# Patient Record
Sex: FEMALE | Race: WHITE | NOT HISPANIC OR LATINO | Employment: FULL TIME | URBAN - METROPOLITAN AREA
[De-identification: names, ages, dates, MRNs, and addresses within clinical notes are randomized per-mention and may not be internally consistent; named-entity substitution may affect disease eponyms.]

---

## 2024-01-29 DIAGNOSIS — Z00.00 ANNUAL PHYSICAL EXAM: Primary | ICD-10-CM

## 2024-01-29 DIAGNOSIS — Z82.49 FAMILY HISTORY OF CORONARY ARTERY DISEASE: ICD-10-CM

## 2024-01-29 DIAGNOSIS — E78.5 HYPERLIPIDEMIA, UNSPECIFIED HYPERLIPIDEMIA TYPE: ICD-10-CM

## 2024-02-07 ENCOUNTER — APPOINTMENT (OUTPATIENT)
Dept: RADIOLOGY | Facility: CLINIC | Age: 54
End: 2024-02-07
Payer: COMMERCIAL

## 2024-02-15 ENCOUNTER — HOSPITAL ENCOUNTER (OUTPATIENT)
Dept: RADIOLOGY | Facility: CLINIC | Age: 54
Discharge: HOME | End: 2024-02-15
Payer: COMMERCIAL

## 2024-02-15 VITALS — HEIGHT: 67 IN | WEIGHT: 142.86 LBS | BODY MASS INDEX: 22.42 KG/M2

## 2024-02-15 DIAGNOSIS — Z12.39 ENCOUNTER FOR OTHER SCREENING FOR MALIGNANT NEOPLASM OF BREAST: ICD-10-CM

## 2024-02-15 PROCEDURE — 77063 BREAST TOMOSYNTHESIS BI: CPT | Mod: BILATERAL PROCEDURE | Performed by: RADIOLOGY

## 2024-02-15 PROCEDURE — 77067 SCR MAMMO BI INCL CAD: CPT | Mod: BILATERAL PROCEDURE | Performed by: RADIOLOGY

## 2024-02-15 PROCEDURE — 77067 SCR MAMMO BI INCL CAD: CPT

## 2024-02-17 ASSESSMENT — PROMIS GLOBAL HEALTH SCALE
RATE_AVERAGE_PAIN: 0
CARRYOUT_PHYSICAL_ACTIVITIES: COMPLETELY
RATE_QUALITY_OF_LIFE: EXCELLENT
RATE_GENERAL_HEALTH: EXCELLENT
RATE_MENTAL_HEALTH: EXCELLENT
RATE_SOCIAL_SATISFACTION: EXCELLENT
EMOTIONAL_PROBLEMS: SOMETIMES
CARRYOUT_SOCIAL_ACTIVITIES: EXCELLENT
RATE_AVERAGE_FATIGUE: MILD
RATE_PHYSICAL_HEALTH: EXCELLENT

## 2024-02-20 ENCOUNTER — HOSPITAL ENCOUNTER (OUTPATIENT)
Dept: RADIOLOGY | Facility: CLINIC | Age: 54
Discharge: HOME | End: 2024-02-20
Payer: COMMERCIAL

## 2024-02-20 DIAGNOSIS — R92.8 OTHER ABNORMAL AND INCONCLUSIVE FINDINGS ON DIAGNOSTIC IMAGING OF BREAST: ICD-10-CM

## 2024-02-20 PROCEDURE — 77065 DX MAMMO INCL CAD UNI: CPT | Mod: LEFT SIDE | Performed by: RADIOLOGY

## 2024-02-20 PROCEDURE — 77061 BREAST TOMOSYNTHESIS UNI: CPT | Mod: LEFT SIDE | Performed by: RADIOLOGY

## 2024-02-20 PROCEDURE — 77061 BREAST TOMOSYNTHESIS UNI: CPT | Mod: LT

## 2024-02-22 NOTE — PROGRESS NOTES
"Subjective   Patient ID: Cecilia Pryor is a 53 y.o. female who presents for Annual Exam.  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  Fam Hx of CAD in Father, Paternal uncle and Paternal Grandfather, favor low LDL goal at least under 100     Said she gained a little weight   Still exercising regularly    Allergies   Allergen Reactions    Pollen Extracts Itching      Current Outpatient Medications   Medication Instructions    cetirizine (ZYRTEC) 10 mg capsule 1 capsule, oral    cholecalciferol (Vitamin D-3) 25 MCG (1000 UT) capsule 1 capsule, oral, Daily    ibuprofen (MOTRIN) 600 mg, oral    levonorgestrel (Mirena) 21 mcg/24 hours (8 yrs) 52 mg IUD 1 each, intrauterine    rosuvastatin (CRESTOR) 20 mg, oral, Daily        Review of Systems  Constitutional  No fatigue, no fevers, no chills, no unintentional weight loss,   HEENT:  No headaches, no dizziness, no double vision, no blurred vision, eye exams current no hearing loss  Cardiovascular:  No chest pain, no palpitations, no shortness of breath with exertion (one flight of stairs),   Respiratory:  No cough, no hemoptysis, no wheezing, No shortness of breath at rest  GI:  No dysphagia, no odynophagia, no reflux, no abdominal pain, no nausea, no vomiting, no changes in bowel habits, no bright red blood per rectum, no melena  :  No urinary frequency, no dysuria, no urine incontinence  MSK:  No falls, no joint pain, no joint swelling  Neuro:  No tremors, no extremity weakness, no changes in sensation    Physical Exam  /72   Pulse 80   Resp 16   Ht 1.676 m (5' 6\")   Wt 66.7 kg (147 lb)   SpO2 95%   BMI 23.73 kg/m²   120/72 left arm seated  General:    Fit, " well-appearing  F in no acute distress, well nourished, well hydrated  Head:  Normocephalic, atraumatic  Skin:          Warm dry,   Eyes:  Anicteric sclera, pupils equal   Ears:        TMs intact  Oral:      Not examined due to pandemic  Neck:   Supple, no cervical/supraclavicular adenopathy, no thyromegaly or nodules appreciated on exam  Cor:      Regular rate, normal S1, S2, no murmurs appreciated, no S3, no S4   Lungs:   Clear to auscultation b/l, no wheezes, no rhonchi, no crackles, no accessory respiratory muscle use  Abd:          Soft, nontender, no guarding, no rebound, no hepatosplenomegaly appreciated   Ext:            No lower extremity edema, no palpable cords  Pulses:      Pedal pulses intact  Neuro:   CN2-12 grossly intact   Breasts:     Not examined, declined, just had mammogram    Assessment/Plan   Problem List Items Addressed This Visit       Heterogeneously dense tissue of both breasts on mammography     Discussed dense breast tissue on mammogram decreases the sensitivity of the mammogram and discussed FAST MRI self pay  Fam hx breast cancer  Patient is BRACA negative  Previously saw high-risk breast cancer clinic who recommended q 6 mos MRI alternating with mammogram and q6 month breast exams, she opted against Tamoxifen/raloxifen         Relevant Orders    MR breast bilateral w IV contrast fast screening self pay    Family history of breast cancer    Relevant Orders    MR breast bilateral w IV contrast fast screening self pay    Family history of early CAD     Fam hx CAD (father stent in his 60s, maternal grandfather and paternal uncle had fatal MI in their 50's)  CT Cardiac Score 12/22/2022 of 0   (was 96 4/2023) and was 107 11/5/2021),   Normal Lp(a) 42 (normal at their lab is <75).  On rosuvastatin 10 mg daily, increased to 20 mg daily   Repeat fx lipids in 6-8 wks         Relevant Medications    rosuvastatin (Crestor) 20 mg tablet    Annual physical exam - Primary     -Previously  received influenza vaccine 11/27/2023  -Previously received Covid vaccines 1/7/2021, 2/7/2021, 9/17/2021, 7/29/2022, had Covid illness 11/1/2023 she plans to get booster  -Previously received Tdap 2/25/2015  -Will call to schedule first Shingrix  -Mammogram 2/20/2024, dense breast tissue, previously saw [facility], opted against tamoxifen/raloxifen  -Colonoscopy 11/18/2022 repeat 5 yrs (done at Clinic) (2018 adenomatous polyp)  -EGD 8/6/2018 fundic gland polyp negative for dysplasia, esophagastric junction biopsy, Focal active inflammation or reactive epithelial changes, scant superficial fragment of gastric type mucosa with no diagnostic abnormality   -To see gynecology (LOV 4/28/2023), ?last pap 11/21??)  -Continue weight bearing exercise   -Continue D3 1000 units daily  -Recommend daily calcium intake of 1200 mg ideally through diet or diet and supplement  -Normal BMI  -Labs ordered Hep C Anti and HIV         Encounter for screening for other viral diseases    Relevant Orders    Hepatitis C antibody    HIV 1/2 Antigen/Antibody Screen with Reflex to Confirmation    Hyperlipidemia     Fam hx CAD (father stent in his 60s, maternal grandfather and paternal uncle had fatal MI in their 50's)  CT Cardiac Score 12/22/2022 of 0   (was 96 4/2023) and was 107 11/5/2021),   Normal Lp(a) 42 (normal at their lab is <75).  On rosuvastatin 10 mg daily, increased to 20 mg daily   Repeat fx lipids in 6-8 wks         Relevant Medications    rosuvastatin (Crestor) 20 mg tablet    Other Relevant Orders    Hepatic Function Panel    Lipid Panel    Inguinal lymphadenopathy     Not examined, said unchanged  Prior US 2023 sub-centimeter  Plans to continue to monitor clinically  Plans to also discuss with gynecology             Mei Wheeler DO

## 2024-02-23 ENCOUNTER — OFFICE VISIT (OUTPATIENT)
Dept: PRIMARY CARE | Facility: CLINIC | Age: 54
End: 2024-02-23
Payer: COMMERCIAL

## 2024-02-23 VITALS
SYSTOLIC BLOOD PRESSURE: 120 MMHG | OXYGEN SATURATION: 95 % | BODY MASS INDEX: 23.63 KG/M2 | WEIGHT: 147 LBS | RESPIRATION RATE: 16 BRPM | HEART RATE: 80 BPM | DIASTOLIC BLOOD PRESSURE: 72 MMHG | HEIGHT: 66 IN

## 2024-02-23 DIAGNOSIS — E78.5 HYPERLIPIDEMIA, UNSPECIFIED HYPERLIPIDEMIA TYPE: ICD-10-CM

## 2024-02-23 DIAGNOSIS — R59.0 INGUINAL LYMPHADENOPATHY: ICD-10-CM

## 2024-02-23 DIAGNOSIS — Z80.3 FAMILY HISTORY OF BREAST CANCER: ICD-10-CM

## 2024-02-23 DIAGNOSIS — R92.333 HETEROGENEOUSLY DENSE TISSUE OF BOTH BREASTS ON MAMMOGRAPHY: ICD-10-CM

## 2024-02-23 DIAGNOSIS — Z11.59 ENCOUNTER FOR SCREENING FOR OTHER VIRAL DISEASES: ICD-10-CM

## 2024-02-23 DIAGNOSIS — Z82.49 FAMILY HISTORY OF EARLY CAD: ICD-10-CM

## 2024-02-23 DIAGNOSIS — Z00.00 ANNUAL PHYSICAL EXAM: Primary | ICD-10-CM

## 2024-02-23 PROCEDURE — 1036F TOBACCO NON-USER: CPT | Performed by: SPECIALIST

## 2024-02-23 PROCEDURE — 99396 PREV VISIT EST AGE 40-64: CPT | Performed by: SPECIALIST

## 2024-02-23 RX ORDER — VIT C/E/ZN/COPPR/LUTEIN/ZEAXAN 250MG-90MG
1 CAPSULE ORAL DAILY
COMMUNITY
Start: 2022-10-19

## 2024-02-23 RX ORDER — ROSUVASTATIN CALCIUM 10 MG/1
1 TABLET, COATED ORAL DAILY
COMMUNITY
Start: 2022-12-29 | End: 2024-02-23 | Stop reason: DRUGHIGH

## 2024-02-23 RX ORDER — ROSUVASTATIN CALCIUM 20 MG/1
20 TABLET, COATED ORAL DAILY
Qty: 90 TABLET | Refills: 1 | Status: SHIPPED | OUTPATIENT
Start: 2024-02-23 | End: 2024-08-21

## 2024-02-23 RX ORDER — PHENYLPROPANOLAMINE/CLEMASTINE 75-1.34MG
600 TABLET, EXTENDED RELEASE ORAL
COMMUNITY

## 2024-02-23 ASSESSMENT — ENCOUNTER SYMPTOMS
DEPRESSION: 0
OCCASIONAL FEELINGS OF UNSTEADINESS: 0
LOSS OF SENSATION IN FEET: 0

## 2024-02-23 ASSESSMENT — PATIENT HEALTH QUESTIONNAIRE - PHQ9
2. FEELING DOWN, DEPRESSED OR HOPELESS: NOT AT ALL
1. LITTLE INTEREST OR PLEASURE IN DOING THINGS: NOT AT ALL
SUM OF ALL RESPONSES TO PHQ9 QUESTIONS 1 AND 2: 0

## 2024-02-23 NOTE — ASSESSMENT & PLAN NOTE
Discussed dense breast tissue on mammogram decreases the sensitivity of the mammogram and discussed FAST MRI self pay  Fam hx breast cancer  Patient is BRACA negative  Previously saw high-risk breast cancer clinic who recommended q 6 mos MRI alternating with mammogram and q6 month breast exams, she opted against Tamoxifen/raloxifen

## 2024-02-23 NOTE — ASSESSMENT & PLAN NOTE
-Previously received influenza vaccine 11/27/2023  -Previously received Covid vaccines 1/7/2021, 2/7/2021, 9/17/2021, 7/29/2022, had Covid illness 11/1/2023 she plans to get booster  -Previously received Tdap 2/25/2015  -Will call to schedule first Shingrix  -Mammogram 2/20/2024, dense breast tissue, previously saw [facility], opted against tamoxifen/raloxifen  -Colonoscopy 11/18/2022 repeat 5 yrs (done at Clinic) (2018 adenomatous polyp)  -EGD 8/6/2018 fundic gland polyp negative for dysplasia, esophagastric junction biopsy, Focal active inflammation or reactive epithelial changes, scant superficial fragment of gastric type mucosa with no diagnostic abnormality   -To see gynecology (LOV 4/28/2023), ?last pap 11/21??)  -Continue weight bearing exercise   -Continue D3 1000 units daily  -Recommend daily calcium intake of 1200 mg ideally through diet or diet and supplement  -Normal BMI  -Labs ordered Hep C Anti and HIV

## 2024-02-24 PROBLEM — R59.0 INGUINAL LYMPHADENOPATHY: Status: ACTIVE | Noted: 2024-02-24

## 2024-02-24 NOTE — ASSESSMENT & PLAN NOTE
Fam hx CAD (father stent in his 60s, maternal grandfather and paternal uncle had fatal MI in their 50's)  CT Cardiac Score 12/22/2022 of 0   (was 96 4/2023) and was 107 11/5/2021),   Normal Lp(a) 42 (normal at their lab is <75).  On rosuvastatin 10 mg daily, increased to 20 mg daily   Repeat fx lipids in 6-8 wks

## 2024-02-24 NOTE — ASSESSMENT & PLAN NOTE
Not examined, said unchanged  Prior  2023 sub-centimeter  Plans to continue to monitor clinically  Plans to also discuss with gynecology

## 2024-05-03 ENCOUNTER — OFFICE VISIT (OUTPATIENT)
Dept: OBSTETRICS AND GYNECOLOGY | Facility: CLINIC | Age: 54
End: 2024-05-03
Payer: COMMERCIAL

## 2024-05-03 VITALS
DIASTOLIC BLOOD PRESSURE: 82 MMHG | BODY MASS INDEX: 24.85 KG/M2 | WEIGHT: 154.6 LBS | SYSTOLIC BLOOD PRESSURE: 138 MMHG | HEIGHT: 66 IN | HEART RATE: 80 BPM

## 2024-05-03 DIAGNOSIS — Z80.3 FAMILY HISTORY OF BREAST CANCER: Primary | ICD-10-CM

## 2024-05-03 DIAGNOSIS — Z12.4 CERVICAL CANCER SCREENING: ICD-10-CM

## 2024-05-03 PROCEDURE — 99396 PREV VISIT EST AGE 40-64: CPT | Performed by: OBSTETRICS & GYNECOLOGY

## 2024-05-03 PROCEDURE — 87624 HPV HI-RISK TYP POOLED RSLT: CPT | Performed by: OBSTETRICS & GYNECOLOGY

## 2024-05-03 PROCEDURE — 1036F TOBACCO NON-USER: CPT | Performed by: OBSTETRICS & GYNECOLOGY

## 2024-05-03 ASSESSMENT — ENCOUNTER SYMPTOMS
ALLERGIC/IMMUNOLOGIC NEGATIVE: 0
HEMATOLOGIC/LYMPHATIC NEGATIVE: 0
CONSTITUTIONAL NEGATIVE: 0
NEUROLOGICAL NEGATIVE: 0
GASTROINTESTINAL NEGATIVE: 0
RESPIRATORY NEGATIVE: 0
MUSCULOSKELETAL NEGATIVE: 0
CARDIOVASCULAR NEGATIVE: 0
ENDOCRINE NEGATIVE: 0
EYES NEGATIVE: 0
PSYCHIATRIC NEGATIVE: 0

## 2024-05-03 ASSESSMENT — PATIENT HEALTH QUESTIONNAIRE - PHQ9
SUM OF ALL RESPONSES TO PHQ9 QUESTIONS 1 AND 2: 0
2. FEELING DOWN, DEPRESSED OR HOPELESS: NOT AT ALL
1. LITTLE INTEREST OR PLEASURE IN DOING THINGS: NOT AT ALL

## 2024-05-03 ASSESSMENT — PAIN SCALES - GENERAL: PAINLEVEL: 0-NO PAIN

## 2024-05-03 NOTE — PROGRESS NOTES
Cecilia Pryor 54 y.o. y/o who presents for annual gyn exam.      Preventive health  - Pap due today, roel (has had normal screening previously, likely last in 2022, but unable to obtain records, will reset screening interval)  - breast cancer screening --> gets fast MRI  - c-scope due 2027 (last in 2022 @ [facility], no specimens at this procedure), has history of adenomas    Reproductive Life Planning  - has LNG IUD in place, wants to leave in until 2025      -------------------------------------  HPI    Here for annual  No complaints  No urinary, bowel complaints  No vaginal bleeding  No complaints with intercourse  Declines menopausal symptoms  Gets mammogram, fast MRI for fam hx of breast cancer (BRCA negative)        Vitals:    05/03/24 0836   BP: 138/82   Pulse: 80       Physical Exam  Constitutional:       Appearance: Normal appearance.   Genitourinary:      Vulva normal.      Right Labia: No rash, lesions, skin changes or Bartholin's cyst.     Left Labia: No lesions, skin changes, Bartholin's cyst or rash.     No cervical discharge, lesion or polyp.      IUD strings visualized.   HENT:      Head: Normocephalic and atraumatic.   Pulmonary:      Effort: Pulmonary effort is normal.   Musculoskeletal:      Cervical back: Neck supple.   Neurological:      General: No focal deficit present.      Mental Status: She is alert.   Skin:     General: Skin is warm.   Psychiatric:         Thought Content: Thought content normal.

## 2024-05-08 PROBLEM — Z86.010 HX OF ADENOMATOUS COLONIC POLYPS: Status: ACTIVE | Noted: 2018-01-01

## 2024-05-08 PROBLEM — Z82.49 FAMILY HISTORY OF ISCHEMIC HEART DISEASE: Status: ACTIVE | Noted: 2024-05-08

## 2024-05-08 PROBLEM — Z85.828 HISTORY OF MALIGNANT NEOPLASM OF SKIN: Status: ACTIVE | Noted: 2024-05-08

## 2024-05-08 PROBLEM — Z86.0101 HX OF ADENOMATOUS COLONIC POLYPS: Status: ACTIVE | Noted: 2018-01-01

## 2024-05-16 LAB
CYTOLOGY CMNT CVX/VAG CYTO-IMP: NORMAL
HPV HR 12 DNA GENITAL QL NAA+PROBE: NEGATIVE
HPV HR GENOTYPES PNL CVX NAA+PROBE: NEGATIVE
HPV16 DNA SPEC QL NAA+PROBE: NEGATIVE
HPV18 DNA SPEC QL NAA+PROBE: NEGATIVE
LAB AP HPV GENOTYPE QUESTION: YES
LAB AP HPV HR: NORMAL
LABORATORY COMMENT REPORT: NORMAL
PATH REPORT.TOTAL CANCER: NORMAL

## 2024-06-19 DIAGNOSIS — E78.5 HYPERLIPIDEMIA, UNSPECIFIED HYPERLIPIDEMIA TYPE: ICD-10-CM

## 2024-06-19 DIAGNOSIS — Z82.49 FAMILY HISTORY OF EARLY CAD: ICD-10-CM

## 2024-06-20 RX ORDER — ROSUVASTATIN CALCIUM 20 MG/1
20 TABLET, COATED ORAL DAILY
Qty: 90 TABLET | Refills: 2 | Status: SHIPPED | OUTPATIENT
Start: 2024-06-20

## 2024-08-02 ENCOUNTER — OFFICE VISIT (OUTPATIENT)
Dept: OBSTETRICS AND GYNECOLOGY | Facility: CLINIC | Age: 54
End: 2024-08-02
Payer: COMMERCIAL

## 2024-08-02 VITALS
WEIGHT: 154.6 LBS | BODY MASS INDEX: 24.85 KG/M2 | DIASTOLIC BLOOD PRESSURE: 81 MMHG | HEIGHT: 66 IN | SYSTOLIC BLOOD PRESSURE: 113 MMHG

## 2024-08-02 DIAGNOSIS — Z12.4 CERVICAL CANCER SCREENING: Primary | ICD-10-CM

## 2024-08-02 PROCEDURE — 1036F TOBACCO NON-USER: CPT | Performed by: OBSTETRICS & GYNECOLOGY

## 2024-08-02 PROCEDURE — 99212 OFFICE O/P EST SF 10 MIN: CPT | Performed by: OBSTETRICS & GYNECOLOGY

## 2024-08-02 PROCEDURE — 3008F BODY MASS INDEX DOCD: CPT | Performed by: OBSTETRICS & GYNECOLOGY

## 2024-08-02 ASSESSMENT — PATIENT HEALTH QUESTIONNAIRE - PHQ9
1. LITTLE INTEREST OR PLEASURE IN DOING THINGS: NOT AT ALL
2. FEELING DOWN, DEPRESSED OR HOPELESS: NOT AT ALL
SUM OF ALL RESPONSES TO PHQ9 QUESTIONS 1 AND 2: 0

## 2024-08-02 ASSESSMENT — ENCOUNTER SYMPTOMS
PSYCHIATRIC NEGATIVE: 0
HEMATOLOGIC/LYMPHATIC NEGATIVE: 0
GASTROINTESTINAL NEGATIVE: 0
CONSTITUTIONAL NEGATIVE: 0
CARDIOVASCULAR NEGATIVE: 0
NEUROLOGICAL NEGATIVE: 0
ENDOCRINE NEGATIVE: 0
ALLERGIC/IMMUNOLOGIC NEGATIVE: 0
MUSCULOSKELETAL NEGATIVE: 0
RESPIRATORY NEGATIVE: 0
EYES NEGATIVE: 0

## 2024-08-02 ASSESSMENT — PAIN SCALES - GENERAL: PAINLEVEL: 0-NO PAIN

## 2024-08-02 NOTE — PROGRESS NOTES
"        Cecilia Pryor presents today with:       Here for repeat Pap, last insufficient  Also with new vaginal pain - discussed lubricants, also briefly introduced vaginal estrogen.          -----------------------------------------------------------------------  ROX    Presents today with  Here for repeat Pap, previous was insufficient    Reports new onset pain after intercourse  Some pink tinged with wiping  Feels pain anteriorly          Visit Vitals  /81   Ht 1.676 m (5' 6\")   Wt 70.1 kg (154 lb 9.6 oz)   BMI 24.95 kg/m²   OB Status Perimenopausal   Smoking Status Never   BSA 1.81 m²       Physical Exam  Constitutional:       Appearance: Normal appearance.   Genitourinary:      Vulva normal.      No lesions in the vagina.      No vaginal erythema, bleeding or ulceration.      Vaginal exam comments: Loss of rugae.   HENT:      Head: Normocephalic and atraumatic.   Pulmonary:      Effort: Pulmonary effort is normal.   Musculoskeletal:      Cervical back: Neck supple.   Neurological:      General: No focal deficit present.      Mental Status: She is alert.   Skin:     General: Skin is warm.   Psychiatric:         Mood and Affect: Mood normal.             "

## 2024-08-08 LAB
CYTOLOGY CMNT CVX/VAG CYTO-IMP: NORMAL
LAB AP HPV HR: NORMAL
LAB AP PREVIOUS ABNORMAL HISTORY: NORMAL
LABORATORY COMMENT REPORT: NORMAL
PATH REPORT.TOTAL CANCER: NORMAL

## 2024-09-13 ENCOUNTER — HOSPITAL ENCOUNTER (OUTPATIENT)
Dept: RADIOLOGY | Facility: HOSPITAL | Age: 54
Discharge: HOME | End: 2024-09-13

## 2024-09-13 DIAGNOSIS — Z80.3 FAMILY HISTORY OF BREAST CANCER: ICD-10-CM

## 2024-09-24 ENCOUNTER — APPOINTMENT (OUTPATIENT)
Dept: RADIOLOGY | Facility: HOSPITAL | Age: 54
End: 2024-09-24

## 2024-10-11 ENCOUNTER — OFFICE VISIT (OUTPATIENT)
Dept: OBSTETRICS AND GYNECOLOGY | Facility: CLINIC | Age: 54
End: 2024-10-11
Payer: COMMERCIAL

## 2024-10-11 VITALS
HEART RATE: 75 BPM | WEIGHT: 149 LBS | SYSTOLIC BLOOD PRESSURE: 151 MMHG | HEIGHT: 66 IN | DIASTOLIC BLOOD PRESSURE: 76 MMHG | BODY MASS INDEX: 23.95 KG/M2

## 2024-10-11 DIAGNOSIS — N76.0 BACTERIAL VAGINOSIS: ICD-10-CM

## 2024-10-11 DIAGNOSIS — B96.89 BACTERIAL VAGINOSIS: ICD-10-CM

## 2024-10-11 DIAGNOSIS — R87.615 UNSATISFACTORY CERVICAL PAPANICOLAOU SMEAR: Primary | ICD-10-CM

## 2024-10-11 PROCEDURE — 88342 IMHCHEM/IMCYTCHM 1ST ANTB: CPT | Performed by: PATHOLOGY

## 2024-10-11 PROCEDURE — 57454 BX/CURETT OF CERVIX W/SCOPE: CPT | Performed by: OBSTETRICS & GYNECOLOGY

## 2024-10-11 PROCEDURE — 88305 TISSUE EXAM BY PATHOLOGIST: CPT | Mod: TC,SUR | Performed by: OBSTETRICS & GYNECOLOGY

## 2024-10-11 PROCEDURE — 1036F TOBACCO NON-USER: CPT | Performed by: OBSTETRICS & GYNECOLOGY

## 2024-10-11 PROCEDURE — 88305 TISSUE EXAM BY PATHOLOGIST: CPT | Performed by: PATHOLOGY

## 2024-10-11 PROCEDURE — 3008F BODY MASS INDEX DOCD: CPT | Performed by: OBSTETRICS & GYNECOLOGY

## 2024-10-11 RX ORDER — METRONIDAZOLE 7.5 MG/G
GEL VAGINAL DAILY
Qty: 70 G | Refills: 2 | Status: SHIPPED | OUTPATIENT
Start: 2024-10-11 | End: 2024-10-16

## 2024-10-11 ASSESSMENT — ENCOUNTER SYMPTOMS
CONSTITUTIONAL NEGATIVE: 0
CARDIOVASCULAR NEGATIVE: 0
ENDOCRINE NEGATIVE: 0
MUSCULOSKELETAL NEGATIVE: 0
RESPIRATORY NEGATIVE: 0
NEUROLOGICAL NEGATIVE: 0
HEMATOLOGIC/LYMPHATIC NEGATIVE: 0
EYES NEGATIVE: 0
GASTROINTESTINAL NEGATIVE: 0
PSYCHIATRIC NEGATIVE: 0
ALLERGIC/IMMUNOLOGIC NEGATIVE: 0

## 2024-10-11 ASSESSMENT — PATIENT HEALTH QUESTIONNAIRE - PHQ9
1. LITTLE INTEREST OR PLEASURE IN DOING THINGS: NOT AT ALL
SUM OF ALL RESPONSES TO PHQ9 QUESTIONS 1 AND 2: 0
2. FEELING DOWN, DEPRESSED OR HOPELESS: NOT AT ALL

## 2024-10-11 ASSESSMENT — PAIN SCALES - GENERAL: PAINLEVEL: 0-NO PAIN

## 2024-10-21 LAB
LAB AP ASR DISCLAIMER: NORMAL
LABORATORY COMMENT REPORT: NORMAL
PATH REPORT.FINAL DX SPEC: NORMAL
PATH REPORT.GROSS SPEC: NORMAL
PATH REPORT.RELEVANT HX SPEC: NORMAL
PATH REPORT.TOTAL CANCER: NORMAL

## 2024-10-23 NOTE — PROGRESS NOTES
"    Colpo Visit    HPI    Here for colposcopy after Paps x 2 were insufficient for interpretation  IUD in place  tobacco use: No      Vitals  /76   Pulse 75   Ht 1.676 m (5' 6\")   Wt 67.6 kg (149 lb)   BMI 24.05 kg/m²     General Appearance:    Alert, cooperative, no distress, appears stated age   Head:    Normocephalic, without obvious abnormality, atraumatic   Throat:   Lips, mucosa, and tongue normal; teeth and gums normal   Neck:   Supple   Lungs:     Breathing easily   Genitalia:    Normal genitalia without lesion   Skin:   Skin color, texture, turgor normal, no rashes or lesions           Colposcopy Procedure Note    Procedure Details   The risks and benefits of the procedure and Written informed consent obtained.    Speculum placed in vagina and excellent visualization of cervix achieved, cervix swabbed x 3 with acetic acid solution.    Findings:  Cervix: fine blood vessels at 5-6 o'clock, faint acetowhite lesions in same location; cervical biopsies taken at 6 o'clock.  Vaginal inspection: normal without visible lesions.              Specimens: 6 o'clock, ECC    Complications: None, tolerated well.      Plan:  Specimens labelled and sent to Pathology.    Impression:  Normal/mild abnormality  Likely will need repeat Pap with HPV test in 1 year  "

## 2024-11-08 ENCOUNTER — HOSPITAL ENCOUNTER (OUTPATIENT)
Dept: RADIOLOGY | Facility: HOSPITAL | Age: 54
Discharge: HOME | End: 2024-11-08
Payer: COMMERCIAL

## 2024-11-08 PROCEDURE — 6100000003 BI MR BREAST BILATERAL WITH CONTRAST FAST SCREENING SELF PAY

## 2024-11-08 PROCEDURE — 2550000001 HC RX 255 CONTRASTS: Performed by: OBSTETRICS & GYNECOLOGY

## 2024-11-08 PROCEDURE — A9575 INJ GADOTERATE MEGLUMI 0.1ML: HCPCS | Performed by: OBSTETRICS & GYNECOLOGY

## 2024-11-08 RX ORDER — GADOTERATE MEGLUMINE 376.9 MG/ML
13 INJECTION INTRAVENOUS
Status: COMPLETED | OUTPATIENT
Start: 2024-11-08 | End: 2024-11-08

## 2025-01-17 ENCOUNTER — APPOINTMENT (OUTPATIENT)
Dept: RADIOLOGY | Facility: HOSPITAL | Age: 55
End: 2025-01-17
Payer: COMMERCIAL

## 2025-02-28 ENCOUNTER — APPOINTMENT (OUTPATIENT)
Dept: PRIMARY CARE | Facility: CLINIC | Age: 55
End: 2025-02-28
Payer: COMMERCIAL

## 2025-02-28 VITALS
HEART RATE: 76 BPM | OXYGEN SATURATION: 99 % | BODY MASS INDEX: 24.91 KG/M2 | WEIGHT: 155 LBS | DIASTOLIC BLOOD PRESSURE: 68 MMHG | RESPIRATION RATE: 16 BRPM | HEIGHT: 66 IN | SYSTOLIC BLOOD PRESSURE: 132 MMHG

## 2025-02-28 DIAGNOSIS — Z82.49 FAMILY HISTORY OF EARLY CAD: ICD-10-CM

## 2025-02-28 DIAGNOSIS — Z85.828 HISTORY OF MALIGNANT NEOPLASM OF SKIN: ICD-10-CM

## 2025-02-28 DIAGNOSIS — Z11.59 ENCOUNTER FOR SCREENING FOR OTHER VIRAL DISEASES: ICD-10-CM

## 2025-02-28 DIAGNOSIS — R68.81 EARLY SATIETY: ICD-10-CM

## 2025-02-28 DIAGNOSIS — Z00.00 ANNUAL PHYSICAL EXAM: Primary | ICD-10-CM

## 2025-02-28 DIAGNOSIS — R92.333 HETEROGENEOUSLY DENSE TISSUE OF BOTH BREASTS ON MAMMOGRAPHY: ICD-10-CM

## 2025-02-28 DIAGNOSIS — Z12.31 ENCOUNTER FOR SCREENING MAMMOGRAM FOR MALIGNANT NEOPLASM OF BREAST: ICD-10-CM

## 2025-02-28 DIAGNOSIS — Z80.3 FAMILY HISTORY OF BREAST CANCER: ICD-10-CM

## 2025-02-28 DIAGNOSIS — E78.5 HYPERLIPIDEMIA, UNSPECIFIED HYPERLIPIDEMIA TYPE: ICD-10-CM

## 2025-02-28 PROCEDURE — 3008F BODY MASS INDEX DOCD: CPT | Performed by: SPECIALIST

## 2025-02-28 PROCEDURE — 1036F TOBACCO NON-USER: CPT | Performed by: SPECIALIST

## 2025-02-28 PROCEDURE — 99396 PREV VISIT EST AGE 40-64: CPT | Performed by: SPECIALIST

## 2025-02-28 RX ORDER — ROSUVASTATIN CALCIUM 20 MG/1
20 TABLET, COATED ORAL DAILY
Qty: 90 TABLET | Refills: 3 | Status: SHIPPED | OUTPATIENT
Start: 2025-02-28

## 2025-02-28 NOTE — ASSESSMENT & PLAN NOTE
Family history of CAD father age 60, paternal uncle (50's) and paternal grandfather (50's)  CT cardiac score 12/2022 of 0   Normal LPA 42   last year  Orders:    rosuvastatin (Crestor) 20 mg tablet; Take 1 tablet (20 mg) by mouth once daily.

## 2025-02-28 NOTE — ASSESSMENT & PLAN NOTE
Previously received annual influenza vaccine 10/2024  Recommend Covid vaccine  Prior Tdap 2/25/15 recommend every 10 yrs  Recommend Prevnar 21 or 20  Recommend Shingrix vaccines  Mammogram 2/15/2024, Diagnositic Left Mammogram 2/20/2024  Fast MRI 11/8/2024 negative  Plans to see gynecology (LOV 10/11/2024, colposcopy)  Colonosocpy 11/18/2022 repeat 5 yrs (Hx of adenomatous polyp 2018)  Prior EGD 2018  Orders:    Comprehensive Metabolic Panel; Future    Lipid Panel; Future    CBC; Future    Vitamin D 25-Hydroxy,Total (for eval of Vitamin D levels); Future

## 2025-02-28 NOTE — PATIENT INSTRUCTIONS
Recommend Covid vaccine  Recommend Prevnar 21 or 20 (pneumonia vaccine)  Recommend Tdap   Recommend Shingrix vaccines

## 2025-02-28 NOTE — PROGRESS NOTES
Subjective   Patient ID: Cecilia Pryor is a 54 y.o. female who presents for No chief complaint on file..  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  Eats decent lunch still a little hungry but then no appetite for dinner or if she snacks     Son at [place] had septic shock (treated at [facility])    Brought form insurance carrier, she'll fill in labs when results are back (usually has to go to [facility])  Will send copy of form/labs if she goes to [facility]    Allergies   Allergen Reactions    Pollen Extracts Itching      Current Outpatient Medications   Medication Instructions    cetirizine (ZYRTEC) 10 mg capsule 1 capsule    cholecalciferol (Vitamin D-3) 25 MCG (1000 UT) capsule 1 capsule, Daily    ibuprofen (MOTRIN) 600 mg    levonorgestrel (Mirena) 21 mcg/24 hours (8 yrs) 52 mg IUD 1 each    rosuvastatin (CRESTOR) 20 mg, oral, Daily        Review of Systems  Constitutional  No fatigue, no fevers, no chills, no unintentional weight loss,   HEENT:  No headaches, no dizziness, no double vision, eye exams current, no blurred vision, no hearing loss  Cardiovascular:  No chest pain, no palpitations, no shortness of breath with exertion (one flight of stairs),   Respiratory:  No cough, no hemoptysis, no wheezing, No shortness of breath at rest  GI:  No dysphagia, no odynophagia, no reflux, no abdominal pain, no nausea, no vomiting, no changes in bowel habits, no bright red blood per rectum, no melena  :  No urinary frequency, no dysuria, no urine incontinence  MSK:  No falls, left hip (rising to sitting) joint pain, no joint swelling  Neuro:  No tremors, no extremity weakness, no changes in  "sensation  Breasts:  No abnormalities on self  breast exam no nipple discharge no skin changes    Physical Exam  /68   Pulse 76   Resp 16   Ht 1.67 m (5' 5.75\")   Wt 70.3 kg (155 lb)   SpO2 99%   BMI 25.21 kg/m² t  Waist 34.5 Hip 40\"  General:    Well-appearing  F in no acute distress, well nourished, well hydrated  Head:  Normocephalic, atraumatic  Skin:          Warm dry,   Eyes:  Anicteric sclera, pupils equal,   Ears:        TMs intact  Oral:      Not examined due to pandemic  Neck:   Supple, no cervical/supraclavicular adenopathy, no thyromegaly or nodules appreciated on exam  Cor:      Regular rate, normal S1, S2, no murmurs appreciated, no S3, no S4   Lungs:   Clear to auscultation b/l, no wheezes, no rhonchi, no crackles, no accessory respiratory muscle use  Abd:          Soft, nontender, no guarding, no rebound, no hepatosplenomegaly appreciated   Ext:            No lower extremity edema, no palpable cords  Pulses:      Pedal pulses intact  Neuro:   CN2-12 grossly intact (except funduscopic exam not performed and visual fields not examined)  Breasts:     Declined getting mammogram soon     Assessment/Plan   Assessment & Plan  Hyperlipidemia, unspecified hyperlipidemia type   (2/15/2024, see media) and was 153 11/2020  CT cardiac score 12/2022 of 0   Normal LPA 42  Fam hx CAD (Father age 60, Paternal uncle and Paternal Grandfather  Continue current dose rosuvastatin, refilled  Labs ordered  Orders:    rosuvastatin (Crestor) 20 mg tablet; Take 1 tablet (20 mg) by mouth once daily.    Comprehensive Metabolic Panel; Future    Lipid Panel; Future    CBC; Future    Family history of early CAD  Family history of CAD father age 60, paternal uncle (50's) and paternal grandfather (50's)  CT cardiac score 12/2022 of 0   Normal LPA 42   last year  Orders:    rosuvastatin (Crestor) 20 mg tablet; Take 1 tablet (20 mg) by mouth once daily.    Encounter for screening mammogram for malignant " neoplasm of breast  Mammogram 2/15/2024, Diagnositic Left Mammogram 2/20/2024  Fast MRI 11/8/2024 negative  Screening mammogram ordered  Orders:    BI mammo bilateral screening tomosynthesis; Future    Encounter for screening for other viral diseases  Labs ordered one time USPSTF recommended screening  Orders:    Hepatitis C Antibody; Future    Annual physical exam  Previously received annual influenza vaccine 10/2024  Recommend Covid vaccine  Prior Tdap 2/25/15 recommend every 10 yrs  Recommend Prevnar 21 or 20  Recommend Shingrix vaccines  Mammogram 2/15/2024, Diagnositic Left Mammogram 2/20/2024  Fast MRI 11/8/2024 negative  Plans to see gynecology (LOV 10/11/2024, colposcopy)  Colonosocpy 11/18/2022 repeat 5 yrs (Hx of adenomatous polyp 2018)  Prior EGD 2018  Orders:    Comprehensive Metabolic Panel; Future    Lipid Panel; Future    CBC; Future    Vitamin D 25-Hydroxy,Total (for eval of Vitamin D levels); Future    Early satiety  Ordered CT Abd/pelvis with contrast  Orders:    CT abdomen pelvis w IV contrast; Future    Heterogeneously dense tissue of both breasts on mammography  Dense breast tissue and Fam Hx Breast Cancer  Previous high-risk breast center eval and recommendations were for mammogram alternating with MRI Breast every 6 mos  Alternates mammogram with FAST MRI  FAST MRI 11/8/2024       History of malignant neoplasm of skin  Sees Derm Monday  Personal hx Basal Cell  Fam Hx Melanoma       Family history of breast cancer  Previous high-risk breast center eval   Mammogram alternating with FAST MRI was recommended   FAST MRI 11/8/2024          eMi Wheeler DO

## 2025-02-28 NOTE — ASSESSMENT & PLAN NOTE
(2/15/2024, see media) and was 153 11/2020  CT cardiac score 12/2022 of 0   Normal LPA 42  Fam hx CAD (Father age 60, Paternal uncle and Paternal Grandfather  Continue current dose rosuvastatin, refilled  Labs ordered  Orders:    rosuvastatin (Crestor) 20 mg tablet; Take 1 tablet (20 mg) by mouth once daily.    Comprehensive Metabolic Panel; Future    Lipid Panel; Future    CBC; Future

## 2025-03-05 LAB
25(OH)D3+25(OH)D2 SERPL-MCNC: 49 NG/ML (ref 30–100)
ALBUMIN SERPL-MCNC: 4.7 G/DL (ref 3.6–5.1)
ALP SERPL-CCNC: 61 U/L (ref 37–153)
ALT SERPL-CCNC: 18 U/L (ref 6–29)
ANION GAP SERPL CALCULATED.4IONS-SCNC: 9 MMOL/L (CALC) (ref 7–17)
AST SERPL-CCNC: 22 U/L (ref 10–35)
BILIRUB SERPL-MCNC: 0.7 MG/DL (ref 0.2–1.2)
BUN SERPL-MCNC: 14 MG/DL (ref 7–25)
CALCIUM SERPL-MCNC: 9.7 MG/DL (ref 8.6–10.4)
CHLORIDE SERPL-SCNC: 101 MMOL/L (ref 98–110)
CHOLEST SERPL-MCNC: 213 MG/DL
CHOLEST/HDLC SERPL: 2.3 (CALC)
CO2 SERPL-SCNC: 30 MMOL/L (ref 20–32)
CREAT SERPL-MCNC: 0.84 MG/DL (ref 0.5–1.03)
EGFRCR SERPLBLD CKD-EPI 2021: 83 ML/MIN/1.73M2
ERYTHROCYTE [DISTWIDTH] IN BLOOD BY AUTOMATED COUNT: 12.5 % (ref 11–15)
GLUCOSE SERPL-MCNC: 92 MG/DL (ref 65–99)
HCT VFR BLD AUTO: 40.2 % (ref 35–45)
HCV AB SERPL QL IA: NORMAL
HDLC SERPL-MCNC: 94 MG/DL
HGB BLD-MCNC: 13.4 G/DL (ref 11.7–15.5)
LDLC SERPL CALC-MCNC: 103 MG/DL (CALC)
MCH RBC QN AUTO: 30 PG (ref 27–33)
MCHC RBC AUTO-ENTMCNC: 33.3 G/DL (ref 32–36)
MCV RBC AUTO: 90.1 FL (ref 80–100)
NONHDLC SERPL-MCNC: 119 MG/DL (CALC)
PLATELET # BLD AUTO: 253 THOUSAND/UL (ref 140–400)
PMV BLD REES-ECKER: 9.2 FL (ref 7.5–12.5)
POTASSIUM SERPL-SCNC: 4.2 MMOL/L (ref 3.5–5.3)
PROT SERPL-MCNC: 7 G/DL (ref 6.1–8.1)
RBC # BLD AUTO: 4.46 MILLION/UL (ref 3.8–5.1)
SODIUM SERPL-SCNC: 140 MMOL/L (ref 135–146)
TRIGL SERPL-MCNC: 69 MG/DL
WBC # BLD AUTO: 4.8 THOUSAND/UL (ref 3.8–10.8)

## 2025-03-12 ENCOUNTER — HOSPITAL ENCOUNTER (OUTPATIENT)
Dept: RADIOLOGY | Facility: CLINIC | Age: 55
Discharge: HOME | End: 2025-03-12
Payer: COMMERCIAL

## 2025-03-12 VITALS — BODY MASS INDEX: 24.91 KG/M2 | HEIGHT: 66 IN | WEIGHT: 155 LBS

## 2025-03-12 DIAGNOSIS — Z12.31 ENCOUNTER FOR SCREENING MAMMOGRAM FOR MALIGNANT NEOPLASM OF BREAST: ICD-10-CM

## 2025-03-12 PROCEDURE — 77067 SCR MAMMO BI INCL CAD: CPT | Performed by: RADIOLOGY

## 2025-03-12 PROCEDURE — 77063 BREAST TOMOSYNTHESIS BI: CPT | Performed by: RADIOLOGY

## 2025-03-12 PROCEDURE — 77067 SCR MAMMO BI INCL CAD: CPT

## 2025-03-16 PROBLEM — Z82.49 FAMILY HISTORY OF ISCHEMIC HEART DISEASE: Status: RESOLVED | Noted: 2024-05-08 | Resolved: 2025-03-16

## 2025-03-16 NOTE — ASSESSMENT & PLAN NOTE
Previous high-risk breast center eval   Mammogram alternating with FAST MRI was recommended   FAST MRI 11/8/2024

## 2025-03-16 NOTE — ASSESSMENT & PLAN NOTE
Dense breast tissue and Fam Hx Breast Cancer  Previous high-risk breast center eval and recommendations were for mammogram alternating with MRI Breast every 6 mos  Alternates mammogram with FAST MRI  FAST MRI 11/8/2024

## 2025-05-28 ENCOUNTER — HOSPITAL ENCOUNTER (OUTPATIENT)
Facility: HOSPITAL | Age: 55
Setting detail: SURGERY ADMIT
End: 2025-05-28
Attending: SURGERY | Admitting: SURGERY
Payer: COMMERCIAL

## 2025-05-28 ENCOUNTER — HOSPITAL ENCOUNTER (OUTPATIENT)
Facility: HOSPITAL | Age: 55
Setting detail: OBSERVATION
Discharge: HOME | End: 2025-05-29
Attending: EMERGENCY MEDICINE | Admitting: SURGERY
Payer: COMMERCIAL

## 2025-05-28 ENCOUNTER — CLINICAL SUPPORT (OUTPATIENT)
Dept: EMERGENCY MEDICINE | Facility: HOSPITAL | Age: 55
End: 2025-05-28
Payer: COMMERCIAL

## 2025-05-28 ENCOUNTER — APPOINTMENT (OUTPATIENT)
Dept: RADIOLOGY | Facility: HOSPITAL | Age: 55
End: 2025-05-28
Payer: COMMERCIAL

## 2025-05-28 ENCOUNTER — ANESTHESIA (OUTPATIENT)
Dept: OPERATING ROOM | Facility: HOSPITAL | Age: 55
End: 2025-05-28
Payer: COMMERCIAL

## 2025-05-28 ENCOUNTER — ANESTHESIA EVENT (OUTPATIENT)
Dept: OPERATING ROOM | Facility: HOSPITAL | Age: 55
End: 2025-05-28
Payer: COMMERCIAL

## 2025-05-28 DIAGNOSIS — K35.30 ACUTE APPENDICITIS WITH LOCALIZED PERITONITIS, WITHOUT PERFORATION, ABSCESS, OR GANGRENE: Primary | ICD-10-CM

## 2025-05-28 PROBLEM — K35.80 ACUTE APPENDICITIS: Status: ACTIVE | Noted: 2025-05-28

## 2025-05-28 LAB
ABO GROUP (TYPE) IN BLOOD: NORMAL
ALBUMIN SERPL BCP-MCNC: 4.7 G/DL (ref 3.4–5)
ALP SERPL-CCNC: 66 U/L (ref 33–110)
ALT SERPL W P-5'-P-CCNC: 11 U/L (ref 7–45)
ANION GAP BLDV CALCULATED.4IONS-SCNC: 13 MMOL/L (ref 10–25)
ANION GAP SERPL CALC-SCNC: 15 MMOL/L (ref 10–20)
ANTIBODY SCREEN: NORMAL
APPEARANCE UR: CLEAR
AST SERPL W P-5'-P-CCNC: 18 U/L (ref 9–39)
ATRIAL RATE: 94 BPM
BASE EXCESS BLDV CALC-SCNC: 2.6 MMOL/L (ref -2–3)
BASOPHILS # BLD AUTO: 0.02 X10*3/UL (ref 0–0.1)
BASOPHILS NFR BLD AUTO: 0.2 %
BILIRUB SERPL-MCNC: 1 MG/DL (ref 0–1.2)
BILIRUB UR STRIP.AUTO-MCNC: NEGATIVE MG/DL
BODY TEMPERATURE: 37 DEGREES CELSIUS
BUN SERPL-MCNC: 14 MG/DL (ref 6–23)
CA-I BLDV-SCNC: 1.19 MMOL/L (ref 1.1–1.33)
CALCIUM SERPL-MCNC: 9.7 MG/DL (ref 8.6–10.6)
CARDIAC TROPONIN I PNL SERPL HS: <3 NG/L (ref 0–34)
CHLORIDE BLDV-SCNC: 100 MMOL/L (ref 98–107)
CHLORIDE SERPL-SCNC: 100 MMOL/L (ref 98–107)
CO2 SERPL-SCNC: 24 MMOL/L (ref 21–32)
COLOR UR: ABNORMAL
CREAT SERPL-MCNC: 0.63 MG/DL (ref 0.5–1.05)
EGFRCR SERPLBLD CKD-EPI 2021: >90 ML/MIN/1.73M*2
EOSINOPHIL # BLD AUTO: 0.02 X10*3/UL (ref 0–0.7)
EOSINOPHIL NFR BLD AUTO: 0.2 %
ERYTHROCYTE [DISTWIDTH] IN BLOOD BY AUTOMATED COUNT: 12.1 % (ref 11.5–14.5)
GLUCOSE BLDV-MCNC: 117 MG/DL (ref 74–99)
GLUCOSE SERPL-MCNC: 116 MG/DL (ref 74–99)
GLUCOSE UR STRIP.AUTO-MCNC: NORMAL MG/DL
HCO3 BLDV-SCNC: 27.2 MMOL/L (ref 22–26)
HCT VFR BLD AUTO: 38.7 % (ref 36–46)
HCT VFR BLD EST: 42 % (ref 36–46)
HGB BLD-MCNC: 13.7 G/DL (ref 12–16)
HGB BLDV-MCNC: 14.1 G/DL (ref 12–16)
HOLD SPECIMEN: NORMAL
HOLD SPECIMEN: NORMAL
IMM GRANULOCYTES # BLD AUTO: 0.04 X10*3/UL (ref 0–0.7)
IMM GRANULOCYTES NFR BLD AUTO: 0.3 % (ref 0–0.9)
INHALED O2 CONCENTRATION: 21 %
KETONES UR STRIP.AUTO-MCNC: ABNORMAL MG/DL
LACTATE BLDV-SCNC: 0.8 MMOL/L (ref 0.4–2)
LEUKOCYTE ESTERASE UR QL STRIP.AUTO: NEGATIVE
LIPASE SERPL-CCNC: 19 U/L (ref 9–82)
LYMPHOCYTES # BLD AUTO: 0.55 X10*3/UL (ref 1.2–4.8)
LYMPHOCYTES NFR BLD AUTO: 4.4 %
MAGNESIUM SERPL-MCNC: 1.94 MG/DL (ref 1.6–2.4)
MCH RBC QN AUTO: 29.8 PG (ref 26–34)
MCHC RBC AUTO-ENTMCNC: 35.4 G/DL (ref 32–36)
MCV RBC AUTO: 84 FL (ref 80–100)
MONOCYTES # BLD AUTO: 0.94 X10*3/UL (ref 0.1–1)
MONOCYTES NFR BLD AUTO: 7.5 %
MUCOUS THREADS #/AREA URNS AUTO: NORMAL /LPF
NEUTROPHILS # BLD AUTO: 11.03 X10*3/UL (ref 1.2–7.7)
NEUTROPHILS NFR BLD AUTO: 87.4 %
NITRITE UR QL STRIP.AUTO: NEGATIVE
NRBC BLD-RTO: 0 /100 WBCS (ref 0–0)
OXYHGB MFR BLDV: 84.8 % (ref 45–75)
P AXIS: 48 DEGREES
P OFFSET: 196 MS
P ONSET: 148 MS
PCO2 BLDV: 41 MM HG (ref 41–51)
PH BLDV: 7.43 PH (ref 7.33–7.43)
PH UR STRIP.AUTO: 6.5 [PH]
PLATELET # BLD AUTO: 254 X10*3/UL (ref 150–450)
PO2 BLDV: 53 MM HG (ref 35–45)
POTASSIUM BLDV-SCNC: 3.8 MMOL/L (ref 3.5–5.3)
POTASSIUM SERPL-SCNC: 3.7 MMOL/L (ref 3.5–5.3)
PR INTERVAL: 148 MS
PROT SERPL-MCNC: 7.4 G/DL (ref 6.4–8.2)
PROT UR STRIP.AUTO-MCNC: ABNORMAL MG/DL
Q ONSET: 222 MS
QRS COUNT: 15 BEATS
QRS DURATION: 82 MS
QT INTERVAL: 370 MS
QTC CALCULATION(BAZETT): 462 MS
QTC FREDERICIA: 429 MS
R AXIS: 94 DEGREES
RBC # BLD AUTO: 4.6 X10*6/UL (ref 4–5.2)
RBC # UR STRIP.AUTO: ABNORMAL MG/DL
RBC #/AREA URNS AUTO: NORMAL /HPF
RH FACTOR (ANTIGEN D): NORMAL
SAO2 % BLDV: 87 % (ref 45–75)
SODIUM BLDV-SCNC: 136 MMOL/L (ref 136–145)
SODIUM SERPL-SCNC: 135 MMOL/L (ref 136–145)
SP GR UR STRIP.AUTO: >1.05
SQUAMOUS #/AREA URNS AUTO: NORMAL /HPF
T AXIS: 53 DEGREES
T OFFSET: 407 MS
UROBILINOGEN UR STRIP.AUTO-MCNC: NORMAL MG/DL
VENTRICULAR RATE: 94 BPM
WBC # BLD AUTO: 12.6 X10*3/UL (ref 4.4–11.3)
WBC #/AREA URNS AUTO: NORMAL /HPF

## 2025-05-28 PROCEDURE — 96372 THER/PROPH/DIAG INJ SC/IM: CPT | Performed by: PHYSICIAN ASSISTANT

## 2025-05-28 PROCEDURE — G0378 HOSPITAL OBSERVATION PER HR: HCPCS

## 2025-05-28 PROCEDURE — 3700000001 HC GENERAL ANESTHESIA TIME - INITIAL BASE CHARGE: Performed by: STUDENT IN AN ORGANIZED HEALTH CARE EDUCATION/TRAINING PROGRAM

## 2025-05-28 PROCEDURE — 81001 URINALYSIS AUTO W/SCOPE: CPT

## 2025-05-28 PROCEDURE — 3600000008 HC OR TIME - EACH INCREMENTAL 1 MINUTE - PROCEDURE LEVEL THREE: Performed by: STUDENT IN AN ORGANIZED HEALTH CARE EDUCATION/TRAINING PROGRAM

## 2025-05-28 PROCEDURE — 84132 ASSAY OF SERUM POTASSIUM: CPT

## 2025-05-28 PROCEDURE — 36415 COLL VENOUS BLD VENIPUNCTURE: CPT

## 2025-05-28 PROCEDURE — 86901 BLOOD TYPING SEROLOGIC RH(D): CPT

## 2025-05-28 PROCEDURE — 83690 ASSAY OF LIPASE: CPT

## 2025-05-28 PROCEDURE — 2500000004 HC RX 250 GENERAL PHARMACY W/ HCPCS (ALT 636 FOR OP/ED)

## 2025-05-28 PROCEDURE — A44970 PR LAP,APPENDECTOMY: Performed by: ANESTHESIOLOGY

## 2025-05-28 PROCEDURE — 99285 EMERGENCY DEPT VISIT HI MDM: CPT | Performed by: EMERGENCY MEDICINE

## 2025-05-28 PROCEDURE — 85025 COMPLETE CBC W/AUTO DIFF WBC: CPT

## 2025-05-28 PROCEDURE — 96374 THER/PROPH/DIAG INJ IV PUSH: CPT

## 2025-05-28 PROCEDURE — 3700000002 HC GENERAL ANESTHESIA TIME - EACH INCREMENTAL 1 MINUTE: Performed by: STUDENT IN AN ORGANIZED HEALTH CARE EDUCATION/TRAINING PROGRAM

## 2025-05-28 PROCEDURE — 88304 TISSUE EXAM BY PATHOLOGIST: CPT | Performed by: PATHOLOGY

## 2025-05-28 PROCEDURE — 96376 TX/PRO/DX INJ SAME DRUG ADON: CPT | Mod: 59

## 2025-05-28 PROCEDURE — 7100000001 HC RECOVERY ROOM TIME - INITIAL BASE CHARGE: Performed by: STUDENT IN AN ORGANIZED HEALTH CARE EDUCATION/TRAINING PROGRAM

## 2025-05-28 PROCEDURE — 2500000005 HC RX 250 GENERAL PHARMACY W/O HCPCS: Performed by: STUDENT IN AN ORGANIZED HEALTH CARE EDUCATION/TRAINING PROGRAM

## 2025-05-28 PROCEDURE — 2550000001 HC RX 255 CONTRASTS: Performed by: EMERGENCY MEDICINE

## 2025-05-28 PROCEDURE — 2500000004 HC RX 250 GENERAL PHARMACY W/ HCPCS (ALT 636 FOR OP/ED): Mod: JZ

## 2025-05-28 PROCEDURE — 2720000007 HC OR 272 NO HCPCS: Performed by: STUDENT IN AN ORGANIZED HEALTH CARE EDUCATION/TRAINING PROGRAM

## 2025-05-28 PROCEDURE — 96361 HYDRATE IV INFUSION ADD-ON: CPT | Mod: 59

## 2025-05-28 PROCEDURE — 84484 ASSAY OF TROPONIN QUANT: CPT

## 2025-05-28 PROCEDURE — 83735 ASSAY OF MAGNESIUM: CPT

## 2025-05-28 PROCEDURE — 74177 CT ABD & PELVIS W/CONTRAST: CPT | Performed by: STUDENT IN AN ORGANIZED HEALTH CARE EDUCATION/TRAINING PROGRAM

## 2025-05-28 PROCEDURE — 44970 LAPAROSCOPY APPENDECTOMY: CPT | Performed by: STUDENT IN AN ORGANIZED HEALTH CARE EDUCATION/TRAINING PROGRAM

## 2025-05-28 PROCEDURE — 2500000004 HC RX 250 GENERAL PHARMACY W/ HCPCS (ALT 636 FOR OP/ED): Performed by: PHYSICIAN ASSISTANT

## 2025-05-28 PROCEDURE — 0752T DGTZ GLS MCRSCP SLD LVL III: CPT | Mod: TC,SUR | Performed by: EMERGENCY MEDICINE

## 2025-05-28 PROCEDURE — 74177 CT ABD & PELVIS W/CONTRAST: CPT

## 2025-05-28 PROCEDURE — 80053 COMPREHEN METABOLIC PANEL: CPT

## 2025-05-28 PROCEDURE — 7100000002 HC RECOVERY ROOM TIME - EACH INCREMENTAL 1 MINUTE: Performed by: STUDENT IN AN ORGANIZED HEALTH CARE EDUCATION/TRAINING PROGRAM

## 2025-05-28 PROCEDURE — 99285 EMERGENCY DEPT VISIT HI MDM: CPT | Mod: 25 | Performed by: EMERGENCY MEDICINE

## 2025-05-28 PROCEDURE — 96375 TX/PRO/DX INJ NEW DRUG ADDON: CPT | Mod: 59

## 2025-05-28 PROCEDURE — 93005 ELECTROCARDIOGRAM TRACING: CPT

## 2025-05-28 PROCEDURE — 86900 BLOOD TYPING SEROLOGIC ABO: CPT

## 2025-05-28 PROCEDURE — 96365 THER/PROPH/DIAG IV INF INIT: CPT | Mod: 59

## 2025-05-28 PROCEDURE — 84075 ASSAY ALKALINE PHOSPHATASE: CPT

## 2025-05-28 PROCEDURE — 2500000005 HC RX 250 GENERAL PHARMACY W/O HCPCS

## 2025-05-28 PROCEDURE — 3600000003 HC OR TIME - INITIAL BASE CHARGE - PROCEDURE LEVEL THREE: Performed by: STUDENT IN AN ORGANIZED HEALTH CARE EDUCATION/TRAINING PROGRAM

## 2025-05-28 PROCEDURE — 2500000004 HC RX 250 GENERAL PHARMACY W/ HCPCS (ALT 636 FOR OP/ED): Performed by: STUDENT IN AN ORGANIZED HEALTH CARE EDUCATION/TRAINING PROGRAM

## 2025-05-28 RX ORDER — SODIUM CHLORIDE 0.9 G/100ML
INJECTION, SOLUTION IRRIGATION AS NEEDED
Status: DISCONTINUED | OUTPATIENT
Start: 2025-05-28 | End: 2025-05-28 | Stop reason: HOSPADM

## 2025-05-28 RX ORDER — ONDANSETRON HYDROCHLORIDE 2 MG/ML
4 INJECTION, SOLUTION INTRAVENOUS EVERY 8 HOURS PRN
Status: DISCONTINUED | OUTPATIENT
Start: 2025-05-28 | End: 2025-05-29 | Stop reason: HOSPADM

## 2025-05-28 RX ORDER — LIDOCAINE HYDROCHLORIDE 20 MG/ML
INJECTION, SOLUTION INFILTRATION; PERINEURAL AS NEEDED
Status: DISCONTINUED | OUTPATIENT
Start: 2025-05-28 | End: 2025-05-28

## 2025-05-28 RX ORDER — ONDANSETRON HYDROCHLORIDE 2 MG/ML
4 INJECTION, SOLUTION INTRAVENOUS ONCE
Status: COMPLETED | OUTPATIENT
Start: 2025-05-28 | End: 2025-05-28

## 2025-05-28 RX ORDER — CHOLECALCIFEROL (VITAMIN D3) 25 MCG
25 TABLET ORAL DAILY
Status: DISCONTINUED | OUTPATIENT
Start: 2025-05-28 | End: 2025-05-29 | Stop reason: HOSPADM

## 2025-05-28 RX ORDER — ROSUVASTATIN CALCIUM 20 MG/1
20 TABLET, COATED ORAL NIGHTLY
Status: DISCONTINUED | OUTPATIENT
Start: 2025-05-28 | End: 2025-05-29 | Stop reason: HOSPADM

## 2025-05-28 RX ORDER — OXYCODONE HYDROCHLORIDE 5 MG/1
5 TABLET ORAL EVERY 6 HOURS PRN
Status: DISCONTINUED | OUTPATIENT
Start: 2025-05-28 | End: 2025-05-29 | Stop reason: HOSPADM

## 2025-05-28 RX ORDER — ONDANSETRON HYDROCHLORIDE 2 MG/ML
INJECTION, SOLUTION INTRAVENOUS AS NEEDED
Status: DISCONTINUED | OUTPATIENT
Start: 2025-05-28 | End: 2025-05-28

## 2025-05-28 RX ORDER — ONDANSETRON 4 MG/1
4 TABLET, ORALLY DISINTEGRATING ORAL EVERY 8 HOURS PRN
Status: DISCONTINUED | OUTPATIENT
Start: 2025-05-28 | End: 2025-05-29 | Stop reason: HOSPADM

## 2025-05-28 RX ORDER — MORPHINE SULFATE 4 MG/ML
4 INJECTION INTRAVENOUS ONCE
Status: DISCONTINUED | OUTPATIENT
Start: 2025-05-28 | End: 2025-05-28

## 2025-05-28 RX ORDER — PROPOFOL 10 MG/ML
INJECTION, EMULSION INTRAVENOUS AS NEEDED
Status: DISCONTINUED | OUTPATIENT
Start: 2025-05-28 | End: 2025-05-28

## 2025-05-28 RX ORDER — METOCLOPRAMIDE HYDROCHLORIDE 5 MG/ML
10 INJECTION INTRAMUSCULAR; INTRAVENOUS ONCE
Status: COMPLETED | OUTPATIENT
Start: 2025-05-28 | End: 2025-05-28

## 2025-05-28 RX ORDER — OXYCODONE HYDROCHLORIDE 5 MG/1
10 TABLET ORAL EVERY 4 HOURS PRN
Status: DISCONTINUED | OUTPATIENT
Start: 2025-05-28 | End: 2025-05-28 | Stop reason: HOSPADM

## 2025-05-28 RX ORDER — DICYCLOMINE HYDROCHLORIDE 10 MG/ML
20 INJECTION INTRAMUSCULAR ONCE
Status: COMPLETED | OUTPATIENT
Start: 2025-05-28 | End: 2025-05-28

## 2025-05-28 RX ORDER — SUCCINYLCHOLINE CHLORIDE 20 MG/ML
INJECTION INTRAMUSCULAR; INTRAVENOUS AS NEEDED
Status: DISCONTINUED | OUTPATIENT
Start: 2025-05-28 | End: 2025-05-28

## 2025-05-28 RX ORDER — OXYCODONE HYDROCHLORIDE 5 MG/1
5 TABLET ORAL EVERY 4 HOURS PRN
Status: DISCONTINUED | OUTPATIENT
Start: 2025-05-28 | End: 2025-05-28 | Stop reason: HOSPADM

## 2025-05-28 RX ORDER — ONDANSETRON HYDROCHLORIDE 2 MG/ML
INJECTION, SOLUTION INTRAVENOUS
Status: COMPLETED
Start: 2025-05-28 | End: 2025-05-28

## 2025-05-28 RX ORDER — HYDROMORPHONE HYDROCHLORIDE 1 MG/ML
INJECTION, SOLUTION INTRAMUSCULAR; INTRAVENOUS; SUBCUTANEOUS AS NEEDED
Status: DISCONTINUED | OUTPATIENT
Start: 2025-05-28 | End: 2025-05-28

## 2025-05-28 RX ORDER — MIDAZOLAM HYDROCHLORIDE 1 MG/ML
INJECTION INTRAMUSCULAR; INTRAVENOUS CONTINUOUS PRN
Status: DISCONTINUED | OUTPATIENT
Start: 2025-05-28 | End: 2025-05-28

## 2025-05-28 RX ORDER — ACETAMINOPHEN 10 MG/ML
INJECTION, SOLUTION INTRAVENOUS AS NEEDED
Status: DISCONTINUED | OUTPATIENT
Start: 2025-05-28 | End: 2025-05-28

## 2025-05-28 RX ORDER — FENTANYL CITRATE 50 UG/ML
25 INJECTION, SOLUTION INTRAMUSCULAR; INTRAVENOUS EVERY 5 MIN PRN
Status: DISCONTINUED | OUTPATIENT
Start: 2025-05-28 | End: 2025-05-28 | Stop reason: HOSPADM

## 2025-05-28 RX ORDER — LIDOCAINE HYDROCHLORIDE 10 MG/ML
0.1 INJECTION, SOLUTION INFILTRATION; PERINEURAL ONCE
Status: DISCONTINUED | OUTPATIENT
Start: 2025-05-28 | End: 2025-05-28 | Stop reason: HOSPADM

## 2025-05-28 RX ORDER — SODIUM CHLORIDE 9 MG/ML
125 INJECTION, SOLUTION INTRAVENOUS CONTINUOUS
Status: DISCONTINUED | OUTPATIENT
Start: 2025-05-28 | End: 2025-05-28

## 2025-05-28 RX ORDER — MORPHINE SULFATE 4 MG/ML
4 INJECTION INTRAVENOUS ONCE
Status: COMPLETED | OUTPATIENT
Start: 2025-05-28 | End: 2025-05-28

## 2025-05-28 RX ORDER — OXYCODONE HYDROCHLORIDE 5 MG/1
10 TABLET ORAL EVERY 6 HOURS PRN
Status: DISCONTINUED | OUTPATIENT
Start: 2025-05-28 | End: 2025-05-29 | Stop reason: HOSPADM

## 2025-05-28 RX ORDER — ENOXAPARIN SODIUM 100 MG/ML
40 INJECTION SUBCUTANEOUS EVERY 24 HOURS
Status: DISCONTINUED | OUTPATIENT
Start: 2025-05-29 | End: 2025-05-29 | Stop reason: HOSPADM

## 2025-05-28 RX ORDER — ROCURONIUM BROMIDE 10 MG/ML
INJECTION, SOLUTION INTRAVENOUS AS NEEDED
Status: DISCONTINUED | OUTPATIENT
Start: 2025-05-28 | End: 2025-05-28

## 2025-05-28 RX ORDER — FENTANYL CITRATE 50 UG/ML
12.5 INJECTION, SOLUTION INTRAMUSCULAR; INTRAVENOUS EVERY 5 MIN PRN
Status: DISCONTINUED | OUTPATIENT
Start: 2025-05-28 | End: 2025-05-28 | Stop reason: HOSPADM

## 2025-05-28 RX ORDER — ACETAMINOPHEN 325 MG/1
650 TABLET ORAL EVERY 6 HOURS
Status: DISCONTINUED | OUTPATIENT
Start: 2025-05-29 | End: 2025-05-29 | Stop reason: HOSPADM

## 2025-05-28 RX ORDER — ENOXAPARIN SODIUM 100 MG/ML
40 INJECTION SUBCUTANEOUS EVERY 24 HOURS
Status: DISCONTINUED | OUTPATIENT
Start: 2025-05-28 | End: 2025-05-28

## 2025-05-28 RX ORDER — KETOROLAC TROMETHAMINE 30 MG/ML
30 INJECTION, SOLUTION INTRAMUSCULAR; INTRAVENOUS ONCE
Status: DISCONTINUED | OUTPATIENT
Start: 2025-05-28 | End: 2025-05-28

## 2025-05-28 RX ORDER — METOCLOPRAMIDE HYDROCHLORIDE 5 MG/ML
10 INJECTION INTRAMUSCULAR; INTRAVENOUS ONCE AS NEEDED
Status: DISCONTINUED | OUTPATIENT
Start: 2025-05-28 | End: 2025-05-28 | Stop reason: HOSPADM

## 2025-05-28 RX ORDER — NALOXONE HYDROCHLORIDE 0.4 MG/ML
0.2 INJECTION, SOLUTION INTRAMUSCULAR; INTRAVENOUS; SUBCUTANEOUS EVERY 5 MIN PRN
Status: DISCONTINUED | OUTPATIENT
Start: 2025-05-28 | End: 2025-05-29 | Stop reason: HOSPADM

## 2025-05-28 RX ORDER — ONDANSETRON HYDROCHLORIDE 2 MG/ML
4 INJECTION, SOLUTION INTRAVENOUS ONCE AS NEEDED
Status: DISCONTINUED | OUTPATIENT
Start: 2025-05-28 | End: 2025-05-28 | Stop reason: HOSPADM

## 2025-05-28 RX ORDER — FENTANYL CITRATE 50 UG/ML
INJECTION, SOLUTION INTRAMUSCULAR; INTRAVENOUS AS NEEDED
Status: DISCONTINUED | OUTPATIENT
Start: 2025-05-28 | End: 2025-05-28

## 2025-05-28 RX ORDER — SODIUM CHLORIDE, SODIUM LACTATE, POTASSIUM CHLORIDE, CALCIUM CHLORIDE 600; 310; 30; 20 MG/100ML; MG/100ML; MG/100ML; MG/100ML
100 INJECTION, SOLUTION INTRAVENOUS CONTINUOUS
Status: DISCONTINUED | OUTPATIENT
Start: 2025-05-28 | End: 2025-05-29 | Stop reason: HOSPADM

## 2025-05-28 RX ADMIN — ONDANSETRON 4 MG: 2 INJECTION INTRAMUSCULAR; INTRAVENOUS at 10:52

## 2025-05-28 RX ADMIN — MORPHINE SULFATE 4 MG: 4 INJECTION, SOLUTION INTRAMUSCULAR; INTRAVENOUS at 06:04

## 2025-05-28 RX ADMIN — PROPOFOL 20 MG: 10 INJECTION, EMULSION INTRAVENOUS at 20:54

## 2025-05-28 RX ADMIN — LIDOCAINE HYDROCHLORIDE 100 MG: 20 INJECTION, SOLUTION INFILTRATION; PERINEURAL at 20:45

## 2025-05-28 RX ADMIN — DICYCLOMINE HYDROCHLORIDE 20 MG: 10 INJECTION, SOLUTION INTRAMUSCULAR at 09:17

## 2025-05-28 RX ADMIN — MORPHINE SULFATE 4 MG: 4 INJECTION, SOLUTION INTRAMUSCULAR; INTRAVENOUS at 10:42

## 2025-05-28 RX ADMIN — HYDROMORPHONE HYDROCHLORIDE 0.5 MG: 1 INJECTION, SOLUTION INTRAMUSCULAR; INTRAVENOUS; SUBCUTANEOUS at 21:28

## 2025-05-28 RX ADMIN — IOHEXOL 75 ML: 350 INJECTION, SOLUTION INTRAVENOUS at 07:11

## 2025-05-28 RX ADMIN — PROPOFOL 180 MG: 10 INJECTION, EMULSION INTRAVENOUS at 20:45

## 2025-05-28 RX ADMIN — PIPERACILLIN SODIUM AND TAZOBACTAM SODIUM 3.38 G: 3; .375 INJECTION, SOLUTION INTRAVENOUS at 10:42

## 2025-05-28 RX ADMIN — HYDROMORPHONE HYDROCHLORIDE 0.2 MG: 1 INJECTION, SOLUTION INTRAMUSCULAR; INTRAVENOUS; SUBCUTANEOUS at 15:19

## 2025-05-28 RX ADMIN — METOCLOPRAMIDE 10 MG: 5 INJECTION, SOLUTION INTRAMUSCULAR; INTRAVENOUS at 09:11

## 2025-05-28 RX ADMIN — HYDROMORPHONE HYDROCHLORIDE 0.5 MG: 1 INJECTION, SOLUTION INTRAMUSCULAR; INTRAVENOUS; SUBCUTANEOUS at 22:12

## 2025-05-28 RX ADMIN — SODIUM CHLORIDE, POTASSIUM CHLORIDE, SODIUM LACTATE AND CALCIUM CHLORIDE 100 ML/HR: 600; 310; 30; 20 INJECTION, SOLUTION INTRAVENOUS at 21:52

## 2025-05-28 RX ADMIN — ONDANSETRON 4 MG: 2 INJECTION INTRAMUSCULAR; INTRAVENOUS at 06:04

## 2025-05-28 RX ADMIN — PIPERACILLIN SODIUM AND TAZOBACTAM SODIUM 3.38 G: 3; .375 INJECTION, SOLUTION INTRAVENOUS at 22:41

## 2025-05-28 RX ADMIN — SODIUM CHLORIDE 125 ML/HR: 0.9 INJECTION, SOLUTION INTRAVENOUS at 10:42

## 2025-05-28 RX ADMIN — SODIUM CHLORIDE, SODIUM LACTATE, POTASSIUM CHLORIDE, AND CALCIUM CHLORIDE: 600; 310; 30; 20 INJECTION, SOLUTION INTRAVENOUS at 20:33

## 2025-05-28 RX ADMIN — ROCURONIUM BROMIDE 30 MG: 10 INJECTION, SOLUTION INTRAVENOUS at 20:54

## 2025-05-28 RX ADMIN — Medication 5 L/MIN: at 21:52

## 2025-05-28 RX ADMIN — SUCCINYLCHOLINE CHLORIDE 120 MG: 20 INJECTION, SOLUTION INTRAMUSCULAR; INTRAVENOUS at 20:45

## 2025-05-28 RX ADMIN — FENTANYL CITRATE 100 MCG: 50 INJECTION, SOLUTION INTRAMUSCULAR; INTRAVENOUS at 20:45

## 2025-05-28 RX ADMIN — SUGAMMADEX 200 MG: 100 INJECTION, SOLUTION INTRAVENOUS at 21:44

## 2025-05-28 RX ADMIN — ONDANSETRON HYDROCHLORIDE 4 MG: 2 INJECTION, SOLUTION INTRAVENOUS at 10:52

## 2025-05-28 RX ADMIN — HYDROMORPHONE HYDROCHLORIDE 0.2 MG: 1 INJECTION, SOLUTION INTRAMUSCULAR; INTRAVENOUS; SUBCUTANEOUS at 17:20

## 2025-05-28 RX ADMIN — PIPERACILLIN SODIUM AND TAZOBACTAM SODIUM 3.38 G: 3; .375 INJECTION, SOLUTION INTRAVENOUS at 16:48

## 2025-05-28 RX ADMIN — DEXAMETHASONE SODIUM PHOSPHATE 8 MG: 4 INJECTION INTRA-ARTICULAR; INTRALESIONAL; INTRAMUSCULAR; INTRAVENOUS; SOFT TISSUE at 20:46

## 2025-05-28 RX ADMIN — ACETAMINOPHEN 1000 MG: 10 INJECTION, SOLUTION INTRAVENOUS at 21:15

## 2025-05-28 RX ADMIN — ONDANSETRON 4 MG: 2 INJECTION, SOLUTION INTRAMUSCULAR; INTRAVENOUS at 21:37

## 2025-05-28 RX ADMIN — HYDROMORPHONE HYDROCHLORIDE 0.2 MG: 1 INJECTION, SOLUTION INTRAMUSCULAR; INTRAVENOUS; SUBCUTANEOUS at 19:53

## 2025-05-28 SDOH — HEALTH STABILITY: MENTAL HEALTH: CURRENT SMOKER: 0

## 2025-05-28 ASSESSMENT — COLUMBIA-SUICIDE SEVERITY RATING SCALE - C-SSRS
1. IN THE PAST MONTH, HAVE YOU WISHED YOU WERE DEAD OR WISHED YOU COULD GO TO SLEEP AND NOT WAKE UP?: NO
2. HAVE YOU ACTUALLY HAD ANY THOUGHTS OF KILLING YOURSELF?: NO
6. HAVE YOU EVER DONE ANYTHING, STARTED TO DO ANYTHING, OR PREPARED TO DO ANYTHING TO END YOUR LIFE?: NO

## 2025-05-28 ASSESSMENT — PAIN SCALES - GENERAL
PAINLEVEL_OUTOF10: 5 - MODERATE PAIN
PAINLEVEL_OUTOF10: 3
PAINLEVEL_OUTOF10: 5 - MODERATE PAIN
PAINLEVEL_OUTOF10: 10 - WORST POSSIBLE PAIN
PAINLEVEL_OUTOF10: 3
PAINLEVEL_OUTOF10: 10 - WORST POSSIBLE PAIN
PAINLEVEL_OUTOF10: 10 - WORST POSSIBLE PAIN
PAINLEVEL_OUTOF10: 4
PAIN_LEVEL: 0
PAINLEVEL_OUTOF10: 0 - NO PAIN
PAINLEVEL_OUTOF10: 7
PAINLEVEL_OUTOF10: 10 - WORST POSSIBLE PAIN
PAINLEVEL_OUTOF10: 0 - NO PAIN
PAINLEVEL_OUTOF10: 10 - WORST POSSIBLE PAIN
PAINLEVEL_OUTOF10: 3

## 2025-05-28 ASSESSMENT — PAIN - FUNCTIONAL ASSESSMENT
PAIN_FUNCTIONAL_ASSESSMENT: 0-10

## 2025-05-28 ASSESSMENT — PAIN DESCRIPTION - FREQUENCY: FREQUENCY: CONSTANT/CONTINUOUS

## 2025-05-28 ASSESSMENT — ENCOUNTER SYMPTOMS
APPETITE CHANGE: 0
CHEST TIGHTNESS: 0
LIGHT-HEADEDNESS: 0
VOMITING: 1
ABDOMINAL PAIN: 1
NAUSEA: 1

## 2025-05-28 ASSESSMENT — PAIN DESCRIPTION - LOCATION
LOCATION: ABDOMEN

## 2025-05-28 ASSESSMENT — PAIN DESCRIPTION - PAIN TYPE: TYPE: ACUTE PAIN

## 2025-05-28 NOTE — ED PROVIDER NOTES
History of Present Illness     History provided by: Patient  Limitations to History: None      HPI:  Cecilia Pryor is a 55-year-old female history of hyperlipidemia, basal cell x 2 of the face, family history of melanoma presenting to the ED for acute onset abdominal pain.  Patient notes around 7 PM last night she had acute onset left-sided lower abdominal pain that then moved into her right lower abdomen, pain has been sharp severe, constant since onset.  No history of abdominal surgeries, had few episodes of nonbloody vomiting secondary to pain.  No abnormal vaginal bleeding or discharge, had a normal BM yesterday, had a colonoscopy years ago that was negative.  No pregnancy concerns, no urinary symptoms.  No chest pain, shortness of breath, fever.    Physical Exam   Triage vitals:  T 36.3 °C (97.3 °F)  HR (!) 103  BP (!) 156/92  RR 16  O2 96 % None (Room air)    General: Awake, alert, uncomfortable appearing clutching abdomen in bed  Eyes: Gaze conjugate.  No scleral icterus or injection  HENT: Normo-cephalic, atraumatic.  CV: Tachycardic rate, regular rhythm. No MRG. Cap refill less than 2 seconds  Resp: Breathing non-labored, clear to auscultation bilaterally  GI: Soft, non-distended, reproducible left and right lower quadrant tenderness, positive McBurney point, mild guarding, no rebound tenderness  MSK/Extremities: No gross bony deformities. Moving all extremities  Skin: Warm. Appropriate color  Neuro: Awake and Alert. Face symmetric. Appropriate tone. Moving all extremities equally.    Medical Decision Making & ED Course   Medical Decision Makin-year-old female presenting to the ED for evaluation of acute onset abdominal pain, started in the left lower quadrant with radiation into the right.  Associated nausea and vomiting, on arrival here patient is uncomfortable appearing, clutching abdomen in the bed, afebrile with otherwise reassuring vitals, tachycardia likely response to pain.  Patient has  generalized tenderness on abdominal exam, greatest in the left and right lower quadrant with mild guarding, DDx including diverticulitis, colitis, appendicitis, obstruction, ileus, ischemia.  History of abdominal surgeries, did consider ovarian pathology but due to constant nature of pain less suspicion, will get CT to further evaluate.  Additionally plan for labs including CBC, CMP, mag, lipase, urinalysis.  Patient treated with morphine Zofran for symptomatic treatment, signed out to oncoming provider pending results of labs and imaging.  ----         Social Determinants of Health which Significantly Impact Care: None identified     EKG Independent Interpretation: See ED course for my independent interpretation if ECG was obtained.    Independent Result Review and Interpretation: Please see MDM and ED course for my independent interpretation of the results    Chronic conditions affecting the patient's care: Please see H&P and MDM    The patient was discussed with the following consultants/services: None    Care Considerations: As document above in [facility]    ED Course:  ED Course as of 05/28/25 1913   Wed May 28, 2025   0626 ED attending attestation this is a 55-year-old female with no prior past medical history who presents with pain in her lower abdomen more localized in the left lower quadrant now radiating to the right lower quadrant.  Started 7 PM last night.  It was gradual in onset not abrupt.  Noted nausea and several episodes of nonbloody vomiting.  Last bowel movement was yesterday.  Denies any urinary symptoms.  Has an IUD in place, denied any vaginal bleeding or discharge.  On my exam patient was tender in the lower abdomen worse on the left than the right but there was referred pain as well as mild guarding. [KF]      ED Course User Index  [KF] Storm Wright MD MPH         Diagnoses as of 05/28/25 1913   Acute appendicitis with localized peritonitis, without perforation, abscess, or gangrene      Disposition   Patient was signed out to oncoming provider at 0700 pending completion of their work-up.  Please see the next provider's transition of care note for the remainder of the patient's care.     Procedures   Procedures    Patient seen and discussed with attending physician    Serena Cunningham DO  Emergency Medicine     Serena Cunningham DO  Resident  05/28/25 2281

## 2025-05-28 NOTE — PROGRESS NOTES
Emergency Medicine Transition of Care Note.    I received Cecilia Pryor in signout from Dr. Cunningham.  Please see the previous ED provider note for all HPI, PE and MDM up to the time of signout at 0700. This is in addition to the primary record.    In brief Cecilia Pryor is an 55 y.o. female presenting for   Chief Complaint   Patient presents with    Abdominal Pain     At the time of signout we were awaiting: labs, CT A/P    CBC shows mild leukocytosis of 12.6 without immature granulocytosis. Normal renal and liver function on CMP. Initial trop <3 with nonischemic EKG. Lipase, Mg WNL. UA noninfected, 1+ blood (no stone on CT, however contrast used). Given IV morphine and zofran initially for pain, n/v. Wet read of CT shows diverticulosis without acute diverticulitis. Final read c/f acute appendicitis. Last PO attempted was last evening. Pt made NPO, started on mIVF. ACS consulted and at bedside for evaluation - please see their consult note for full details. Plan for OR today, admit to gen surg service, started on empiric IV zosyn.    Staffed with supervising physician, Dr. Jang, who agrees with workup and treatment plan.    Diagnostics     Labs Reviewed   CBC WITH AUTO DIFFERENTIAL - Abnormal       Result Value    WBC 12.6 (*)     nRBC 0.0      RBC 4.60      Hemoglobin 13.7      Hematocrit 38.7      MCV 84      MCH 29.8      MCHC 35.4      RDW 12.1      Platelets 254      Neutrophils % 87.4      Immature Granulocytes %, Automated 0.3      Lymphocytes % 4.4      Monocytes % 7.5      Eosinophils % 0.2      Basophils % 0.2      Neutrophils Absolute 11.03 (*)     Immature Granulocytes Absolute, Automated 0.04      Lymphocytes Absolute 0.55 (*)     Monocytes Absolute 0.94      Eosinophils Absolute 0.02      Basophils Absolute 0.02     COMPREHENSIVE METABOLIC PANEL - Abnormal    Glucose 116 (*)     Sodium 135 (*)     Potassium 3.7      Chloride 100      Bicarbonate 24      Anion Gap 15      Urea Nitrogen 14       Creatinine 0.63      eGFR >90      Calcium 9.7      Albumin 4.7      Alkaline Phosphatase 66      Total Protein 7.4      AST 18      Bilirubin, Total 1.0      ALT 11     BLOOD GAS VENOUS FULL PANEL - Abnormal    POCT pH, Venous 7.43      POCT pCO2, Venous 41      POCT pO2, Venous 53 (*)     POCT SO2, Venous 87 (*)     POCT Oxy Hemoglobin, Venous 84.8 (*)     POCT Hematocrit Calculated, Venous 42.0      POCT Sodium, Venous 136      POCT Potassium, Venous 3.8      POCT Chloride, Venous 100      POCT Ionized Calicum, Venous 1.19      POCT Glucose, Venous 117 (*)     POCT Lactate, Venous 0.8      POCT Base Excess, Venous 2.6      POCT HCO3 Calculated, Venous 27.2 (*)     POCT Hemoglobin, Venous 14.1      POCT Anion Gap, Venous 13.0      Patient Temperature 37.0      FiO2 21     URINALYSIS WITH REFLEX CULTURE AND MICROSCOPIC - Abnormal    Color, Urine Light-Yellow      Appearance, Urine Clear      Specific Gravity, Urine >1.050 (*)     pH, Urine 6.5      Protein, Urine 20 (TRACE)      Glucose, Urine Normal      Blood, Urine 0.06 (1+) (*)     Ketones, Urine TRACE (*)     Bilirubin, Urine NEGATIVE      Urobilinogen, Urine Normal      Nitrite, Urine NEGATIVE      Leukocyte Esterase, Urine NEGATIVE     MAGNESIUM - Normal    Magnesium 1.94     LIPASE - Normal    Lipase 19      Narrative:     Venipuncture immediately after or during the administration of Metamizole may lead to falsely low results. Testing should be performed immediately prior to Metamizole dosing.   TROPONIN I, HIGH SENSITIVITY - Normal    Troponin I, High Sensitivity (CMC) <3      Narrative:     Less than 99th percentile of normal range cutoff-  Female and children under 18 years old <35 ng/L; Male <54 ng/L: Negative  Repeat testing should be performed if clinically indicated.     Female and children under 18 years old  ng/L; Male  ng/L:  Consistent with possible cardiac damage and possible increased clinical   risk. Serial measurements may help  to assess extent of myocardial damage.     >120 ng/L: Consistent with cardiac damage, increased clinical risk and  myocardial infarction. Serial measurements may help assess extent of   myocardial damage.      NOTE: Children less than 1 year old may have higher baseline troponin   levels and results should be interpreted in conjunction with the overall   clinical context.    NOTE: Troponin I testing is performed using a different   testing methodology at [facility] than at other   [facility]. Direct result comparisons should only   be made within the same method.     URINALYSIS WITH REFLEX CULTURE AND MICROSCOPIC    Narrative:     The following orders were created for panel order Urinalysis with Reflex Culture and Microscopic.  Procedure                               Abnormality         Status                     ---------                               -----------         ------                     Urinalysis with Reflex C...[[id number]]  Abnormal            Final result               Extra Urine Gray Tube[[id number]]                            In process                   Please view results for these tests on the individual orders.   EXTRA URINE GRAY TUBE   URINALYSIS MICROSCOPIC WITH REFLEX CULTURE    WBC, Urine 1-5      RBC, Urine 3-5      Squamous Epithelial Cells, Urine 10-25 (FEW)      Mucus, Urine FEW         CT abdomen pelvis w IV contrast   Final Result   1. Dilated appendix with appendicolith and mild adjacent fat   stranding and free fluid, most compatible with acute appendicitis. No   evidence of free air or fluid collections to suggest complications or   rupture..   2. Colonic diverticulosis without acute diverticulitis.        I personally reviewed the images/study and I agree with radiology   resident Dr. Jonathan Ashley findings as stated. This study was   interpreted at [facility],   [place], [place]        MACRO:   The following change, acute  appendicitis, was made to the preliminary   report.  Dr. Dallas Verduzco MD was notified of this change, via phone,   on 5/28/2025 10:01 am.  (**-[facility]-**)                       Signed by: Ruthann Post 5/28/2025 10:10 AM   Dictation workstation:   [id number]            ED Course as of 05/28/25 1050   Wed May 28, 2025   0626 ED attending attestation this is a 55-year-old female with no prior past medical history who presents with pain in her lower abdomen more localized in the left lower quadrant now radiating to the right lower quadrant.  Started 7 PM last night.  It was gradual in onset not abrupt.  Noted nausea and several episodes of nonbloody vomiting.  Last bowel movement was yesterday.  Denies any urinary symptoms.  Has an IUD in place, denied any vaginal bleeding or discharge.  On my exam patient was tender in the lower abdomen worse on the left than the right but there was referred pain as well as mild guarding. [KF]      ED Course User Index  [KF] Storm Wright MD MPH         Diagnoses as of 05/28/25 1050   Acute appendicitis with localized peritonitis, without perforation, abscess, or gangrene       Final diagnoses:   [K35.30] Acute appendicitis with localized peritonitis, without perforation, abscess, or gangrene         Edna Otoole PA-C    perforation, abscess, or gangrene         Mildred Jang MD

## 2025-05-28 NOTE — H&P
[facility]  ACUTE CARE SURGERY - HISTORY AND PHYSICAL / CONSULT    Patient Name: Cecilia Pryor  MRN: [id number]  Admit Date: 528  : 1970  AGE: 55 y.o.   GENDER: female  ==============================================================================  TODAY'S ASSESSMENT AND PLAN OF CARE:  Cecilia Pryor is a 55 y.o. female with no significant medical history presenting with localized right lower quadrant pain, CT imaging studies and physical exam concordant with diagnosis of acute appendicitis.    Surgical treatment of appendicitis discussed with patient, who is amenable to operative intervention.    Patient consented at bedside for laparoscopic, possible open appendectomy.    Plan:  - Admit to [facility]  - N.P.O., IV antibiotics, mIVF  - OR today for laparoscopic appendectomy  - Consent obtained at bedside    Discussed with attending Dr. Brian Lofton MD  PGY-1 General Surgery  Acute Care Surgery [id number]    ==============================================================================  CHIEF COMPLAINT/REASON FOR CONSULT:  Cecilia Pryor is a 55 y.o. female with medical history significant for hyperlipidemia, presenting to [facility] ED with a 1 day history of abdominal pain.    Per patient, pain started approximately 10 PM last night, beginning as a vague discomfort in midline, progressing to focal tenderness in right lower quadrant.  During this time, patient has had multiple episodes of nausea, vomiting, however continues to pass gas.  Upon admission to ED, CT abdomen pelvis obtained, demonstrating appendicolith right lower quadrant, with dilation of the appendix to 1.1 cm with free fluid.    Exam demonstrating focal right lower quadrant tenderness, no obvious guarding, or rebound tenderness.     Labs on admission demosntrating leukocytosis to 12.6,.     Patient denies prior abdominal surgeries, previous cardiac/pulmonary conditions.  Denies smoking.        PAST MEDICAL  HISTORY:   PMH: Hyperlipidemia  Medical History[1]    PSH: Knee surgery, breast reduction  Surgical History[2]  FH: No family history of colon or rectal cancer.  Noncontributory  Family History[3]  SOCIAL HISTORY:    Smoking: Denies Tobacco Use History[4]    Alcohol: Socially   Social History     Substance and Sexual Activity   Alcohol Use Yes    Alcohol/week: 2.0 - 3.0 standard drinks of alcohol    Types: 2 - 3 Standard drinks or equivalent per week       Drug use: denies    MEDICATIONS:   Prior to Admission medications    Medication Sig Start Date End Date Taking? Authorizing Provider   cetirizine (ZYRTEC) 10 mg capsule Take 1 capsule (10 mg) by mouth.    Historical Provider, MD   cholecalciferol (Vitamin D-3) 25 MCG (1000 UT) capsule Take 1 capsule (25 mcg) by mouth once daily. 10/19/22   Historical Provider, MD   ibuprofen (Motrin) capsule Take 3 capsules (600 mg) by mouth.    Historical Provider, MD   levonorgestrel (Mirena) 21 mcg/24 hours (8 yrs) 52 mg IUD 52 mg by intrauterine route.    Historical Provider, MD   rosuvastatin (Crestor) 20 mg tablet Take 1 tablet (20 mg) by mouth once daily. 2/28/25   Mei Wheeler DO     ALLERGIES: Denies  RX Allergies[5]    REVIEW OF SYSTEMS:  Review of Systems   Constitutional:  Negative for appetite change.   Respiratory:  Negative for chest tightness.    Gastrointestinal:  Positive for abdominal pain, nausea and vomiting.   Neurological:  Negative for syncope and light-headedness.     PHYSICAL EXAM:  Physical Exam  Constitutional:       General: She is not in acute distress.     Appearance: She is ill-appearing. She is not toxic-appearing.   HENT:      Head: Normocephalic and atraumatic.      Mouth/Throat:      Pharynx: No oropharyngeal exudate or posterior oropharyngeal erythema.   Eyes:      Extraocular Movements: Extraocular movements intact.      Pupils: Pupils are equal, round, and reactive to light.   Cardiovascular:      Rate and Rhythm: Normal rate.      Heart  sounds: No murmur heard.  Abdominal:      Tenderness: There is abdominal tenderness.      Comments: Focal right lower quadrant pain, mild voluntary guarding, no rebound tenderness.   Musculoskeletal:         General: No deformity.      Cervical back: No rigidity.   Skin:     General: Skin is warm and dry.   Neurological:      General: No focal deficit present.      Mental Status: She is alert.       IMAGING SUMMARY:  (summary of findings, not a copy of dictation)  Dilated appendix to 1.1 cm, appendicoliths, free fluid attempt.  No definitive bowel obstruction or transition point.    LABS:  Results from last 7 days   Lab Units 05/28/25  0558   WBC AUTO x10*3/uL 12.6*   HEMOGLOBIN g/dL 13.7   HEMATOCRIT % 38.7   PLATELETS AUTO x10*3/uL 254   NEUTROS PCT AUTO % 87.4   LYMPHS PCT AUTO % 4.4   MONOS PCT AUTO % 7.5   EOS PCT AUTO % 0.2         Results from last 7 days   Lab Units 05/28/25  0558   SODIUM mmol/L 135*   POTASSIUM mmol/L 3.7   CHLORIDE mmol/L 100   CO2 mmol/L 24   BUN mg/dL 14   CREATININE mg/dL 0.63   CALCIUM mg/dL 9.7   PROTEIN TOTAL g/dL 7.4   BILIRUBIN TOTAL mg/dL 1.0   ALK PHOS U/L 66   ALT U/L 11   AST U/L 18   GLUCOSE mg/dL 116*     Results from last 7 days   Lab Units 05/28/25  0558   BILIRUBIN TOTAL mg/dL 1.0             I have reviewed all laboratory and imaging results ordered/pertinent for this encounter.         [1]   Past Medical History:  Diagnosis Date    BRCA negative     BRCA1 negative 2019    BRCA2 negative 2019    Encounter for full-term uncomplicated delivery     Spontaneous vaginal delivery    Family history of ischemic heart disease 05/08/2024    Personal history of colonic polyps 10/19/2022    History of adenomatous polyp of colon    Personal history of other malignant neoplasm of skin     History of other malignant neoplasm of skin   [2]   Past Surgical History:  Procedure Laterality Date    ANTERIOR CRUCIATE LIGAMENT REPAIR  05/13/2014    Primary Repair Of Knee Ligament Cruciate  Anterior    BREAST SURGERY  05/13/2014    Breast Surgery Reduction Procedure Bilateral    OTHER SURGICAL HISTORY  05/13/2014    Knee Arthroscopy With Medial Collateral Ligament Repair    OTHER SURGICAL HISTORY  10/19/2022    Mohs surgery    OTHER SURGICAL HISTORY  10/19/2022    Colonoscopy    REDUCTION MAMMAPLASTY  1990   [3]   Family History  Problem Relation Name Age of Onset    Atrial fibrillation Mother      Coronary artery disease Father          stents age 60    Melanoma Father      Breast cancer Mother's Sister      Ovarian cancer Father's Sister      Breast cancer Father's Sister      Heart attack Father's Brother          fatal age 50s    Heart attack Maternal Grandfather          fatal in his 50s   [4]   Social History  Tobacco Use   Smoking Status Never   Smokeless Tobacco Never   [5]   Allergies  Allergen Reactions    Pollen Extracts Itching

## 2025-05-28 NOTE — PROGRESS NOTES
"Pharmacy Medication History Review    Cecilia Pryor is a 55 y.o. female admitted for Acute appendicitis. Pharmacy reviewed the patient's [id number]-ahjxfujfh medications and allergies for accuracy.    Medications ADDED:  N/A  Medications CHANGED:  Zyrtec   Ibuprofen   Medications REMOVED:   N/A     The list below reflects the updated PTA list.   Prior to Admission Medications   Prescriptions Last Dose Informant   cetirizine (ZYRTEC) 10 mg capsule Past Month Self   Sig: Take 1 capsule (10 mg) by mouth once daily as needed.   cholecalciferol (Vitamin D-3) 25 MCG (1000 UT) capsule 2025 Self   Sig: Take 1 capsule (25 mcg) by mouth once daily.   ibuprofen (Motrin) capsule 2025 Self   Sig: Take 3 capsules (600 mg) by mouth once daily as needed.   levonorgestrel (Mirena) 21 mcg/24 hours (8 yrs) 52 mg IUD  Self   Si mg by intrauterine route.   rosuvastatin (Crestor) 20 mg tablet 2025 Self   Sig: Take 1 tablet (20 mg) by mouth once daily.      Facility-Administered Medications: None        The list below reflects the updated allergy list. Please review each documented allergy for additional clarification and justification.  Allergies  Reviewed by Lizabeth Severino on 2025        Severity Reactions Comments    Pollen Extracts Not Specified Itching             Patient accepts M2B at discharge.     Sources:   [facility]  Pharmacy dispense history  Patient Interview Good historian  Chart Review  Care Everywhere     Additional Comments:  N/A      LIZABETH SEVERINO  Pharmacy Technician  25     Secure Chat preferred   If no response call [phone] or [phone] \"Med Rec\"   "

## 2025-05-29 VITALS
HEART RATE: 83 BPM | RESPIRATION RATE: 16 BRPM | SYSTOLIC BLOOD PRESSURE: 133 MMHG | WEIGHT: 152.56 LBS | DIASTOLIC BLOOD PRESSURE: 73 MMHG | OXYGEN SATURATION: 95 % | HEIGHT: 66 IN | BODY MASS INDEX: 24.52 KG/M2 | TEMPERATURE: 98.4 F

## 2025-05-29 LAB
ALBUMIN SERPL BCP-MCNC: 4.1 G/DL (ref 3.4–5)
ANION GAP SERPL CALC-SCNC: 11 MMOL/L (ref 10–20)
BUN SERPL-MCNC: 13 MG/DL (ref 6–23)
CALCIUM SERPL-MCNC: 8.9 MG/DL (ref 8.6–10.6)
CHLORIDE SERPL-SCNC: 102 MMOL/L (ref 98–107)
CO2 SERPL-SCNC: 30 MMOL/L (ref 21–32)
CREAT SERPL-MCNC: 0.78 MG/DL (ref 0.5–1.05)
EGFRCR SERPLBLD CKD-EPI 2021: 90 ML/MIN/1.73M*2
ERYTHROCYTE [DISTWIDTH] IN BLOOD BY AUTOMATED COUNT: 12.3 % (ref 11.5–14.5)
GLUCOSE SERPL-MCNC: 152 MG/DL (ref 74–99)
HCT VFR BLD AUTO: 37.8 % (ref 36–46)
HGB BLD-MCNC: 12.2 G/DL (ref 12–16)
MAGNESIUM SERPL-MCNC: 2.17 MG/DL (ref 1.6–2.4)
MCH RBC QN AUTO: 29.4 PG (ref 26–34)
MCHC RBC AUTO-ENTMCNC: 32.3 G/DL (ref 32–36)
MCV RBC AUTO: 91 FL (ref 80–100)
NRBC BLD-RTO: 0 /100 WBCS (ref 0–0)
PHOSPHATE SERPL-MCNC: 3.7 MG/DL (ref 2.5–4.9)
PLATELET # BLD AUTO: 204 X10*3/UL (ref 150–450)
POTASSIUM SERPL-SCNC: 4.1 MMOL/L (ref 3.5–5.3)
RBC # BLD AUTO: 4.15 X10*6/UL (ref 4–5.2)
SODIUM SERPL-SCNC: 139 MMOL/L (ref 136–145)
WBC # BLD AUTO: 11.1 X10*3/UL (ref 4.4–11.3)

## 2025-05-29 PROCEDURE — 2500000004 HC RX 250 GENERAL PHARMACY W/ HCPCS (ALT 636 FOR OP/ED): Mod: JZ

## 2025-05-29 PROCEDURE — 83735 ASSAY OF MAGNESIUM: CPT

## 2025-05-29 PROCEDURE — G0378 HOSPITAL OBSERVATION PER HR: HCPCS

## 2025-05-29 PROCEDURE — 36415 COLL VENOUS BLD VENIPUNCTURE: CPT

## 2025-05-29 PROCEDURE — 85027 COMPLETE CBC AUTOMATED: CPT

## 2025-05-29 PROCEDURE — 80069 RENAL FUNCTION PANEL: CPT

## 2025-05-29 PROCEDURE — 2500000001 HC RX 250 WO HCPCS SELF ADMINISTERED DRUGS (ALT 637 FOR MEDICARE OP)

## 2025-05-29 PROCEDURE — 96372 THER/PROPH/DIAG INJ SC/IM: CPT

## 2025-05-29 RX ORDER — OXYCODONE HYDROCHLORIDE 5 MG/1
5 TABLET ORAL EVERY 6 HOURS PRN
Qty: 20 TABLET | Refills: 0 | Status: SHIPPED | OUTPATIENT
Start: 2025-05-29 | End: 2025-05-29 | Stop reason: HOSPADM

## 2025-05-29 RX ORDER — ACETAMINOPHEN 325 MG/1
650 TABLET ORAL EVERY 6 HOURS
Qty: 112 TABLET | Refills: 0 | Status: SHIPPED | OUTPATIENT
Start: 2025-05-29 | End: 2025-05-29 | Stop reason: HOSPADM

## 2025-05-29 RX ADMIN — ACETAMINOPHEN 650 MG: 325 TABLET ORAL at 00:18

## 2025-05-29 RX ADMIN — ACETAMINOPHEN 650 MG: 325 TABLET ORAL at 11:55

## 2025-05-29 RX ADMIN — PIPERACILLIN SODIUM AND TAZOBACTAM SODIUM 3.38 G: 3; .375 INJECTION, SOLUTION INTRAVENOUS at 05:40

## 2025-05-29 RX ADMIN — ACETAMINOPHEN 650 MG: 325 TABLET ORAL at 05:41

## 2025-05-29 RX ADMIN — ENOXAPARIN SODIUM 40 MG: 100 INJECTION SUBCUTANEOUS at 08:59

## 2025-05-29 RX ADMIN — OXYCODONE 10 MG: 5 TABLET ORAL at 00:19

## 2025-05-29 SDOH — ECONOMIC STABILITY: FOOD INSECURITY: HOW HARD IS IT FOR YOU TO PAY FOR THE VERY BASICS LIKE FOOD, HOUSING, MEDICAL CARE, AND HEATING?: NOT HARD AT ALL

## 2025-05-29 SDOH — SOCIAL STABILITY: SOCIAL INSECURITY: ARE YOU OR HAVE YOU BEEN THREATENED OR ABUSED PHYSICALLY, EMOTIONALLY, OR SEXUALLY BY ANYONE?: NO

## 2025-05-29 SDOH — SOCIAL STABILITY: SOCIAL INSECURITY
WITHIN THE LAST YEAR, HAVE YOU BEEN RAPED OR FORCED TO HAVE ANY KIND OF SEXUAL ACTIVITY BY YOUR PARTNER OR EX-PARTNER?: NO

## 2025-05-29 SDOH — SOCIAL STABILITY: SOCIAL INSECURITY: WITHIN THE LAST YEAR, HAVE YOU BEEN AFRAID OF YOUR PARTNER OR EX-PARTNER?: NO

## 2025-05-29 SDOH — ECONOMIC STABILITY: TRANSPORTATION INSECURITY: IN THE PAST 12 MONTHS, HAS LACK OF TRANSPORTATION KEPT YOU FROM MEDICAL APPOINTMENTS OR FROM GETTING MEDICATIONS?: NO

## 2025-05-29 SDOH — ECONOMIC STABILITY: HOUSING INSECURITY: AT ANY TIME IN THE PAST 12 MONTHS, WERE YOU HOMELESS OR LIVING IN A SHELTER (INCLUDING NOW)?: NO

## 2025-05-29 SDOH — ECONOMIC STABILITY: FOOD INSECURITY: WITHIN THE PAST 12 MONTHS, THE FOOD YOU BOUGHT JUST DIDN'T LAST AND YOU DIDN'T HAVE MONEY TO GET MORE.: NEVER TRUE

## 2025-05-29 SDOH — ECONOMIC STABILITY: HOUSING INSECURITY: IN THE LAST 12 MONTHS, WAS THERE A TIME WHEN YOU WERE NOT ABLE TO PAY THE MORTGAGE OR RENT ON TIME?: NO

## 2025-05-29 SDOH — SOCIAL STABILITY: SOCIAL INSECURITY: HAVE YOU HAD ANY THOUGHTS OF HARMING ANYONE ELSE?: NO

## 2025-05-29 SDOH — ECONOMIC STABILITY: INCOME INSECURITY: IN THE PAST 12 MONTHS HAS THE ELECTRIC, GAS, OIL, OR WATER COMPANY THREATENED TO SHUT OFF SERVICES IN YOUR HOME?: NO

## 2025-05-29 SDOH — ECONOMIC STABILITY: FOOD INSECURITY: WITHIN THE PAST 12 MONTHS, YOU WORRIED THAT YOUR FOOD WOULD RUN OUT BEFORE YOU GOT THE MONEY TO BUY MORE.: NEVER TRUE

## 2025-05-29 SDOH — SOCIAL STABILITY: SOCIAL INSECURITY: WITHIN THE LAST YEAR, HAVE YOU BEEN HUMILIATED OR EMOTIONALLY ABUSED IN OTHER WAYS BY YOUR PARTNER OR EX-PARTNER?: NO

## 2025-05-29 SDOH — SOCIAL STABILITY: SOCIAL INSECURITY: DOES ANYONE TRY TO KEEP YOU FROM HAVING/CONTACTING OTHER FRIENDS OR DOING THINGS OUTSIDE YOUR HOME?: NO

## 2025-05-29 SDOH — ECONOMIC STABILITY: INCOME INSECURITY
IN THE PAST 12 MONTHS HAS THE ELECTRIC, GAS, OIL, OR WATER COMPANY THREATENED TO SHUT OFF SERVICES IN YOUR HOME?: PATIENT UNABLE TO ANSWER

## 2025-05-29 SDOH — SOCIAL STABILITY: SOCIAL INSECURITY: DO YOU FEEL UNSAFE GOING BACK TO THE PLACE WHERE YOU ARE LIVING?: NO

## 2025-05-29 SDOH — ECONOMIC STABILITY: HOUSING INSECURITY: IN THE PAST 12 MONTHS, HOW MANY TIMES HAVE YOU MOVED WHERE YOU WERE LIVING?: 0

## 2025-05-29 SDOH — SOCIAL STABILITY: SOCIAL INSECURITY: ABUSE: ADULT

## 2025-05-29 SDOH — ECONOMIC STABILITY: FOOD INSECURITY: HOW HARD IS IT FOR YOU TO PAY FOR THE VERY BASICS LIKE FOOD, HOUSING, MEDICAL CARE, AND HEATING?: NOT VERY HARD

## 2025-05-29 SDOH — SOCIAL STABILITY: SOCIAL INSECURITY
WITHIN THE LAST YEAR, HAVE YOU BEEN KICKED, HIT, SLAPPED, OR OTHERWISE PHYSICALLY HURT BY YOUR PARTNER OR EX-PARTNER?: NO

## 2025-05-29 SDOH — ECONOMIC STABILITY: HOUSING INSECURITY: IN THE LAST 12 MONTHS, WAS THERE A TIME WHEN YOU WERE NOT ABLE TO PAY THE MORTGAGE OR RENT ON TIME?: YES

## 2025-05-29 SDOH — SOCIAL STABILITY: SOCIAL INSECURITY: DO YOU FEEL ANYONE HAS EXPLOITED OR TAKEN ADVANTAGE OF YOU FINANCIALLY OR OF YOUR PERSONAL PROPERTY?: NO

## 2025-05-29 SDOH — SOCIAL STABILITY: SOCIAL INSECURITY: ARE THERE ANY APPARENT SIGNS OF INJURIES/BEHAVIORS THAT COULD BE RELATED TO ABUSE/NEGLECT?: NO

## 2025-05-29 SDOH — SOCIAL STABILITY: SOCIAL INSECURITY: HAS ANYONE EVER THREATENED TO HURT YOUR FAMILY OR YOUR PETS?: NO

## 2025-05-29 SDOH — SOCIAL STABILITY: SOCIAL INSECURITY: WERE YOU ABLE TO COMPLETE ALL THE BEHAVIORAL HEALTH SCREENINGS?: YES

## 2025-05-29 SDOH — SOCIAL STABILITY: SOCIAL INSECURITY: HAVE YOU HAD THOUGHTS OF HARMING ANYONE ELSE?: NO

## 2025-05-29 ASSESSMENT — COGNITIVE AND FUNCTIONAL STATUS - GENERAL
PATIENT BASELINE BEDBOUND: NO
MOBILITY SCORE: 24
DAILY ACTIVITIY SCORE: 24

## 2025-05-29 ASSESSMENT — PAIN SCALES - GENERAL
PAINLEVEL_OUTOF10: 5 - MODERATE PAIN
PAINLEVEL_OUTOF10: 7

## 2025-05-29 ASSESSMENT — ACTIVITIES OF DAILY LIVING (ADL)
WALKS IN HOME: INDEPENDENT
PATIENT'S MEMORY ADEQUATE TO SAFELY COMPLETE DAILY ACTIVITIES?: YES
LACK_OF_TRANSPORTATION: NO
FEEDING YOURSELF: INDEPENDENT
GROOMING: INDEPENDENT
ASSISTIVE_DEVICE: CONTACTS
JUDGMENT_ADEQUATE_SAFELY_COMPLETE_DAILY_ACTIVITIES: YES
HEARING - RIGHT EAR: FUNCTIONAL
BATHING: INDEPENDENT
LACK_OF_TRANSPORTATION: NO
DRESSING YOURSELF: INDEPENDENT
ADEQUATE_TO_COMPLETE_ADL: YES
TOILETING: INDEPENDENT
HEARING - LEFT EAR: FUNCTIONAL

## 2025-05-29 ASSESSMENT — PATIENT HEALTH QUESTIONNAIRE - PHQ9
1. LITTLE INTEREST OR PLEASURE IN DOING THINGS: NOT AT ALL
SUM OF ALL RESPONSES TO PHQ9 QUESTIONS 1 & 2: 0
2. FEELING DOWN, DEPRESSED OR HOPELESS: NOT AT ALL

## 2025-05-29 ASSESSMENT — LIFESTYLE VARIABLES
HOW MANY STANDARD DRINKS CONTAINING ALCOHOL DO YOU HAVE ON A TYPICAL DAY: PATIENT DOES NOT DRINK
HOW OFTEN DO YOU HAVE 6 OR MORE DRINKS ON ONE OCCASION: NEVER
SKIP TO QUESTIONS 9-10: 1
AUDIT-C TOTAL SCORE: 0
AUDIT-C TOTAL SCORE: 0
HOW OFTEN DO YOU HAVE A DRINK CONTAINING ALCOHOL: NEVER

## 2025-05-29 ASSESSMENT — PAIN - FUNCTIONAL ASSESSMENT: PAIN_FUNCTIONAL_ASSESSMENT: 0-10

## 2025-05-29 ASSESSMENT — PAIN DESCRIPTION - LOCATION: LOCATION: ABDOMEN

## 2025-05-29 NOTE — ANESTHESIA PROCEDURE NOTES
Airway  Date/Time: 5/28/2025 8:46 PM  Reason: elective    Airway not difficult    Staffing  Performed: resident   Authorized by: Jacy Liu MD    Performed by: Glenn Salinas DO  Patient location during procedure: OR    Patient Condition  Indications for airway management: anesthesia and airway protection  Patient position: sniffing  MILS maintained throughout  Planned trial extubation  Sedation level: deep     Final Airway Details   Preoxygenated: yes  Final airway type: endotracheal airway  Successful airway: ETT  Cuffed: yes   Successful intubation technique: direct laryngoscopy  Adjuncts used in placement: intubating stylet and cricoid pressure  Endotracheal tube insertion site: oral  Blade: Kristofer  Blade size: #3  ETT size (mm): 7.0  Cormack-Lehane Classification: grade I - full view of glottis  Placement verified by: capnometry   Measured from: teeth  ETT to teeth (cm): 20  Number of attempts at approach: 1

## 2025-05-29 NOTE — DISCHARGE INSTRUCTIONS
Wound Care: Laparoscopic incisions    Keep the area dry:  Do not let the Dermabond get wet for the first 24-48 hours. After that, it's typically okay to gently wash the area with mild soap and water, but avoid scrubbing directly on the incision site.  Avoid soaking in water (like swimming, hot tubs, or baths) for at least 1-2 weeks or as directed by your surgeon.  Avoid pulling or stretching the skin:  Try not to put excessive tension on the skin around the incision (for example, by lifting heavy objects or stretching during activities).  Do not pick at or scratch the Dermabond. Let it naturally slough off as it starts to peel away (usually within 5-10 days).  Monitor for signs of infection:  Watch for redness, warmth, swelling, or increased pain around the incision site.  Clear or light drainage is normal in the first few days, but if you notice pus, foul odor, or excessive drainage, contact our clinic.   If you develop a fever or feel unwell, contact our clinic.    No direct sun exposure:  Avoid exposing the incision area to direct sunlight for several months to prevent scarring.  If the incision site is exposed to the sun, apply sunscreen once the Dermabond has fallen off.  Avoid tight clothing over the incisions:  Tight clothes or bands can rub on the Dermabond and may cause irritation or premature removal. Wear loose, comfortable clothing that won't disturb the incision site.  Do not apply ointments, creams, or other products to the incision unless directed by your doctor:  Avoid using anything like Neosporin, alcohol, or hydrogen peroxide on the Dermabond, as these can disrupt the healing process.  What to expect:  Over time, the Dermabond will start to peel or flake off naturally. It usually comes off in 5-10 days.  Follow-up care:  Attend scheduled follow-up appointments to ensure that the wound is healing properly and to address any concerns.  If you notice any abnormal changes or have questions, don't  hesitate to reach out to our office, [phone].  When to Seek Immediate Medical Attention  Severe redness, warmth, or swelling at the incision site  Excessive pain that is not relieved by prescribed pain medications  Signs of infection like pus or foul-smelling drainage  Fever above 100.4°F (38°C)        Please follow up with your primary care provider at next available appointment following your hospital stay.     You have an appointment scheduled with the Trauma/ACS clinic, if you have any questions please call [phone]. This is not an emergency number, so if you have an emergency, please go to the Emergency Room. However, if you have questions regarding your care, upcoming appointments, etc, please do not hesitate to call the above number. Thank you!     Please go to the nearest hospital emergency room if you are experiencing: worsening abdominal pain, increasing abdominal distention, fevers, inability to tolerate food and drink (vomit every time you eat), nausea/vomiting, loss of bowel function (unable to pass gas or have bowel movements).     If you notice increased redness, tenderness or drainage around your surgical sites/wounds, or if you notice foul smelling drainage from your wounds please call the trauma clinic or go to the nearest to the emergency room.

## 2025-05-29 NOTE — OP NOTE
LAPAROSCOPIC APPENDECTOMY OPERATIVE REPORT  DATE: 2025  NAME: Cecilia Pryor, AGE: 55 y.o., : 1970, MRN: [id number]  OR Location: [facility] OR    Procedure: APPENDECTOMY, LAPAROSCOPIC  20412 - ID LAPAROSCOPIC APPENDECTOMY    APPENDECTOMY, LAPAROSCOPIC  [id number] - ID LAPAROSCOPIC APPENDECTOMY       INDICATION FOR PROCEDURE:  Cecilia rPyor is a 55 y.o. female who presented to [facility] with new onset abdominal pain with clinical workup and imaging consistent with acute appendicitis.  We had a long discussion with regard to the rationale for appendectomy in this setting. We also discussed potential complications of bleeding, infection, injury to surrounding structures, along with need for further procedures. Cecilia Pryor understood and wished to proceed with the above procedure.     DESCRIPTION OF PROCEDURE:  Cecilia Pryor was identified preoperatively by two identifiers. They were brought to the operating room and placed on the table in supine position. A standard preoperative briefing was performed. General anesthesia was induced and the patient was intubated. Patient was secured in position with left arm tucked and all pressure points were padded. Skin was prepped using ChloraPrep and draped in the usual sterile fashion. A timeout was performed.     We made a 2 cm curvilinear incision in the infraumbilical area. We carried this down to subcutaneous tissues using blunt dissection.  Upon reaching the abdominal fascia, we grasped the umbilicus with a Kocher clamp and retracted anteriorly.  The abdominal fascia was incised with a scalpel and we then spread into the peritoneal cavity.  We then placed a stay suture of 0 Vicryl at umbilical fascial incision. A Mily trocar was then introduced through the umbilical fascial incision and insufflated to a pressure of 15 mmHg without issue. We then inserted a 5 mm 30 degree laparoscope into the abdomen. Inspection of the abdomen  which demonstrated good hemostasis and no evidence of any underlying bowel injury.  We proceeded to place two remaining 5 mm ports in the suprapubic area and left lower quadrant under direct visualization. The patient was placed in gentle Trendelenburg position with the left side down. We then turned our attention to the right lower quadrant. The omentum was gently mobilized away and we were able to visualize the appendix which appeared necrotic and phlegmonous throughout . The terminal ileum as clearly identified and the cecum was then rotated medially to elevate the appendix.  We were able to gently grasp the appendix and subsequently follow the body of the appendix down to the base along the cecum.  There, we created a mesenteric window at the base of the appendix with the maryland dissector.  We then took the mesentery of the appendix using the endo KING white load. We took down the base of the appendix  flush with the cecum using a single blue-load fire of the Endo-KING stapler.     Final examination demonstrated good hemostasis along the staple line without evidence of injury to the underlying bowel.  There was purulence in cul-de-sac which was suctioned out. The appendix was then removed through the infraumbilical trocar using an EndoCatch bag under direct visualization.  The abdomen was desufflated. Trocars were removed under direct visulalization and the infraumbilical fascia was closed by tying down our previously placed 0 Vicryl suture.  Following injection of local anesthetic, port sites were closed with interrupted 4-0 Monocryl subcuticular stitches and dressed with DermaFlex glue.      Sponge and instrument counts were correct x2. The patient was awakened from general anesthesia and taken to PACU in good condition. As the attending surgeon, I was present for the entire procedure.    OR SUMMARY:  Surgeons      * Marisela Daugherty - Primary  Resident/Fellow/Other Assistant:  Surgeons and Role:     * Holly BREAUX  MD Elenita - Assisting     * Sumit Vanegas MD - Assisting  OR Staff:  Circulator: Valeriy Lemon Person: Steven   Anesthesia Staff:  Anesthesiologist: Jacy Liu MD; Mary Casey MD  Anesthesia Resident: Drew Howell MD; Glenn Salinas DO    DVT Prophylaxis: bilateral SCDs    Prophylactic antibiotics: n/a, patient is on scheduled antibiotics    Preop diagnosis:  Pre-op Diagnosis      * Acute appendicitis with localized peritonitis, without perforation, abscess, or gangrene [K35.30]  Postop diagnosis:  Post-op Diagnosis     * Acute appendicitis with localized peritonitis, without perforation, abscess, or gangrene [K35.30]    Anesthesia: Anesthesia type not filed in the log. ASA: ASA status not filed in the log.  EBL: 10 cc  Intaoperative I/Os:       Anesthesia Record               Intraprocedure I/O Totals       None            Intraoperative medications:  Administrations occurring from 2005 to 2240 on 05/28/25:   Medication Name Total Dose   sodium chloride 0.9 % irrigation solution 3,000 mL   acetaminophen (Ofirmev) injection 1,000 mg   dexAMETHasone (Decadron) 4 mg/mL 8 mg   enoxaparin (Lovenox) syringe 40 mg Cannot be calculated   fentaNYL (Sublimaze) injection 50 mcg/mL 100 mcg   HYDROmorphone (Dilaudid) injection 0.2 mg Cannot be calculated   HYDROmorphone (Dilaudid) 1 mg/mL injection 0.5 mg   LR bolus Cannot be calculated   lidocaine (Xylocaine) injection 2 % 100 mg   ondansetron 2 mg/mL 4 mg   propofol (Diprivan) injection 10 mg/mL 200 mg   rocuronium (ZeMuron) 50 mg/5 mL injection 30 mg   succinylcholine 20 mg/mL VIAL 120 mg       ---  Marisela Daugherty MD  Trauma, Critical Care, and Acute Care Surgery

## 2025-05-29 NOTE — ANESTHESIA PREPROCEDURE EVALUATION
NEPHROLOGY CONSULTATION NOTE    Patient: Jazmin Jean               Sex: female          DOA: 1/25/2024  6:19 PM   YOB: 1956        Age:  67 y.o.        LOS:  LOS: 1 day     REFERRING PHYSICIAN: Dr. Thurman     REASON FOR THE REFERRAL / CONSULTATION:  Hyponatremia    DATE OF CONSULTATION / SERVICE: 1/26/2024    ADMISSION DIAGNOSIS: Hyponatremia     CHIEF COMPLAINT     Dizziness, shakiness and epistaxis    HPI     70-year-old female with prior history of hyponatremia secondary to SIADH, alcohol abuse hypertension, obesity who presented to our facility with symptoms noted above.  Patient admits to be taking salt tablets at home with last serum sodium level noted to be 134 meq/L on December 28, 2023.  Upon presentation to ED yesterday noted sodium level of 120 mEq/L and low.  Received 1 L of normal saline with improvement in sodium level to 123 mEq/L this morning.  Currently patient appears fatigued and denies any further dizziness, nausea vomiting diarrhea.  Admits to intake of alcohol prior to admission.  Her EtOH level was noted to be 50.  She also admits to decreased p.o. intake over the past 1 week.         PAST MEDICAL HISTORY     History reviewed. No pertinent past medical history.  Hypertension  SIADH  Alcohol abuse    PAST SURGICAL HISTORY     Past Surgical History:   Procedure Laterality Date    ABDOMINAL SURGERY      EYE SURGERY      JOINT REPLACEMENT         ALLERGIES     Allergies   Allergen Reactions    Iodinated Contrast Media Hives and Palpitations    Prednisone & Diphenhydramine Hives       SOCIAL HISTORY     Social History     Substance and Sexual Activity   Alcohol Use Yes    Comment: social     Social History     Substance and Sexual Activity   Drug Use Never     Social History     Tobacco Use   Smoking Status Never   Smokeless Tobacco Never       FAMILY HISTORY     History reviewed. No pertinent family history.    CURRENT MEDICATIONS       Current Facility-Administered Medications:  Patient: Cecilia Pryor    Procedure Information       Anesthesia Start Date/Time: 05/28/25 2040    Procedure: APPENDECTOMY, LAPAROSCOPIC    Location: [facility] OR 11 / Virtual [facility] OR    Surgeons: Marisela Daugherty MD            Relevant Problems   Cardiac   (+) Hyperlipidemia       Clinical information reviewed:   Tobacco  Allergies  Meds   Med Hx  Surg Hx   Fam Hx  Soc Hx        NPO Detail:  No data recorded     Physical Exam    Airway  Mallampati: I  TM distance: >3 FB  Neck ROM: full  Mouth opening: 3 or more finger widths     Cardiovascular   Rhythm: regular  Rate: normal     Dental - normal exam     Pulmonary - normal examBreath sounds clear to auscultation     Abdominal            Anesthesia Plan    History of general anesthesia?: yes  History of complications of general anesthesia?: no    ASA 2     general     The patient is not a current smoker.  Patient was not previously instructed to abstain from smoking on day of procedure.  Patient did not smoke on day of procedure.    intravenous induction   Postoperative pain plan includes opioids.  Trial extubation is planned.  Anesthetic plan and risks discussed with patient.  Use of blood products discussed with patient who consented to blood products.    Plan discussed with attending and resident.           acetaminophen (TYLENOL) tablet 650 mg, 650 mg, Oral, Q6H PRN, Vy France-Giovanni, CRNP    amLODIPine (NORVASC) tablet 10 mg, 10 mg, Oral, Daily, Vy Edilma-Giovanni, CRNP, 10 mg at 01/26/24 0949    butalbital-acetaminophen-caffeine (FIORICET,ESGIC) -40 mg per tablet 1 tablet, 1 tablet, Oral, Q6H PRN, Vy France-Giovanni, IVONNENP    calcium carbonate (TUMS) chewable tablet 1,000 mg, 1,000 mg, Oral, Daily PRN, Vy France-Giovanni, IVONNENP    docusate sodium (COLACE) capsule 100 mg, 100 mg, Oral, BID, Vy France-Giovanni, CRNP, 100 mg at 01/26/24 0949    enoxaparin (LOVENOX) subcutaneous injection 40 mg, 40 mg, Subcutaneous, Daily, Vy Daily, CRNP, 40 mg at 01/26/24 0948    folic acid (FOLVITE) tablet 1 mg, 1 mg, Oral, Daily, Vy France-Giovanni, CRNP, 1 mg at 01/26/24 0949    furosemide (LASIX) injection 20 mg, 20 mg, Intravenous, Once, Shadi Young MD    melatonin tablet 6 mg, 6 mg, Oral, HS PRN, Claudette Glover PA-C, 6 mg at 01/26/24 0158    metoclopramide (REGLAN) injection 10 mg, 10 mg, Intravenous, Q6H PRN, Vy Daily, CRNP, 10 mg at 01/26/24 0545    metoprolol succinate (TOPROL-XL) 24 hr tablet 25 mg, 25 mg, Oral, TID, Vy France-Giovanni CRNP, 25 mg at 01/26/24 0949    multivitamin-minerals (CENTRUM) tablet 1 tablet, 1 tablet, Oral, Daily, Vy France-Giovanni, CRNP, 1 tablet at 01/26/24 0949    pantoprazole (PROTONIX) EC tablet 40 mg, 40 mg, Oral, Daily, Vy KilkennyRANDAL Heath, 40 mg at 01/26/24 0949    sertraline (ZOLOFT) tablet 100 mg, 100 mg, Oral, HS, RANDAL Anders, 100 mg at 01/26/24 0019    sodium chloride (OCEAN) 0.65 % nasal spray 1 spray, 1 spray, Each Nare, Q1H PRN, RANDAL Anders    sodium chloride tablet 3 g, 3 g, Oral, 4x Daily (with meals and at bedtime), Shadi Young MD, 3 g at 01/26/24 0951    thiamine tablet 100 mg, 100 mg, Oral, Daily, RANDAL Anders, 100 mg at  01/26/24 0949    REVIEW OF SYSTEMS     Review of Systems   Constitutional: Negative.    HENT: Negative.     Eyes: Negative.    Respiratory: Negative.     Cardiovascular: Negative.    Gastrointestinal: Negative.    Endocrine: Negative.    Genitourinary: Negative.    Musculoskeletal: Negative.    Skin: Negative.    Allergic/Immunologic: Negative.    Neurological:  Positive for dizziness, tremors and weakness.   Hematological: Negative.    All other systems reviewed and are negative.        OBJECTIVE     Current Weight: Weight - Scale: 95.5 kg (210 lb 8.6 oz)  Vitals:    01/26/24 1050   BP: 140/80   Pulse: 97   Resp: 18   Temp:    SpO2: 97%     Body mass index is 36.14 kg/m².    Intake/Output Summary (Last 24 hours) at 1/26/2024 1132  Last data filed at 1/26/2024 0900  Gross per 24 hour   Intake 1080 ml   Output 25 ml   Net 1055 ml       PHYSICAL EXAMINATION     Physical Exam  Constitutional:       Appearance: She is well-developed. She is obese. She is ill-appearing.   HENT:      Head: Normocephalic and atraumatic.   Eyes:      Pupils: Pupils are equal, round, and reactive to light.   Cardiovascular:      Rate and Rhythm: Normal rate and regular rhythm.      Heart sounds: Normal heart sounds.   Pulmonary:      Effort: Pulmonary effort is normal.   Abdominal:      General: Bowel sounds are normal.      Palpations: Abdomen is soft.   Musculoskeletal:         General: Normal range of motion.      Cervical back: Neck supple.   Skin:     General: Skin is warm.   Neurological:      Mental Status: She is alert and oriented to person, place, and time.           LAB RESULTS        Results from last 7 days   Lab Units 01/26/24  0835 01/25/24  2206 01/25/24  1906   WBC Thousand/uL  --   --  3.73*   HEMOGLOBIN g/dL  --   --  13.1   HEMATOCRIT %  --   --  36.0   PLATELETS Thousands/uL  --  110* 122*   POTASSIUM mmol/L 3.2* 3.5 3.7   CHLORIDE mmol/L 89* 92* 89*   CO2 mmol/L 21 15* 14*   BUN mg/dL 5 9 12   CREATININE mg/dL 0.73 0.78  0.89   EGFR ml/min/1.73sq m 85 78 67   CALCIUM mg/dL 8.2* 7.9* 8.6       I have personally reviewed the old medical records and patient's previously known baseline creatinine level is ~0.89    RADIOLOGY RESULTS     IMPRESSION:        No evidence of proctitis or perirectal inflammation. No findings to indicate diverticulitis or colitis.    PLAN / RECOMMENDATIONS      This is a 67 years old female with past medical history of hypertension, hyponatremia secondary to SIADH, obesity, alcohol abuse who presents to our facility with epistaxis, bright red blood per rectum, dizziness and tremors and found to have hyponatremia.    1.  Hyponatremia: Etiology has been SIADH.  Sodium is noted to be 120 mg/L and likely exacerbated by use of alcohol, decreased p.o. intake and recent URI.  Workup revealed elevated urine sodium.  Urine osmolality and serum osmolality are currently pending.  Will initiate patient on high-dose salt tablets 3 g 4 times daily with administration of one-time afternoon.  Target sodium of 128 meq/L by a.m.    2.  Hypokalemia: Noted potassium 3.2.  Ordered K. Dur 40 mEq.    3.  Hypertension: Blood pressure within acceptable range up to 140/80.    Thank you for the consultation to participate in patient's care. I have personally discussed my plan with the referring physician.     Shadi Young MD    1/26/2024

## 2025-05-29 NOTE — ANESTHESIA POSTPROCEDURE EVALUATION
Patient: Cecilia Pryor    Procedure Summary       Date: 05/28/25 Room / Location: [facility] OR 11 / Virtual [facility] OR    Anesthesia Start: 2040 Anesthesia Stop: 2157    Procedure: APPENDECTOMY, LAPAROSCOPIC Diagnosis:       Acute appendicitis with localized peritonitis, without perforation, abscess, or gangrene      (Acute appendicitis with localized peritonitis, without perforation, abscess, or gangrene [K35.30])    Surgeons: Marisela Daugherty MD Responsible Provider: Mary Casey MD    Anesthesia Type: general ASA Status: 2            Anesthesia Type: No value filed.    Vitals Value Taken Time   /85 05/28/25 21:57   Temp 36.7 05/28/25 21:57   Pulse 103 05/28/25 21:53   Resp 16 05/28/25 21:53   SpO2 97 % 05/28/25 21:53   Vitals shown include unfiled device data.    Anesthesia Post Evaluation    Patient location during evaluation: PACU  Patient participation: complete - patient participated  Level of consciousness: sleepy but conscious  Pain score: 0  Pain management: adequate  Multimodal analgesia pain management approach  Airway patency: patent  Cardiovascular status: acceptable, blood pressure returned to baseline and hemodynamically stable  Respiratory status: acceptable, airway suctioned, face mask and spontaneous ventilation  Hydration status: acceptable  Postoperative Nausea and Vomiting: none        No notable events documented.

## 2025-05-29 NOTE — PROGRESS NOTES
Cecilia Pryor is a 55 y.o. female on day 0 of admission presenting with Acute appendicitis.      Transitional Care Coordination Progress Note:  Patient discussed during interdisciplinary rounds.     Plan per Medical/Surgical team:   Home. No home going needs anticipated for this pt at this time.      Discharge disposition: Home. No home going needs anticipated for this pt at this time.      Potential Barriers: None    ADOD: 5/29    This TCC will continue to follow for home going needs and safe DC plan.      FREDI LEWIS

## 2025-05-29 NOTE — DISCHARGE SUMMARY
"Discharge Diagnosis  Acute appendicitis    Issues Requiring Follow-Up  Post op check with [facility] clinic  Follow up with PCP    Test Results Pending At Discharge  Pending Labs       Order Current Status    Surgical Pathology Exam In process            Hospital Course   Cecilia Pryor is a 55 y.o. female with medical history significant for hyperlipidemia, presenting to [facility] ED with a 1 day history of abdominal pain.     Per patient, pain started approximately 10 PM prior to coming into the hospital, beginning as a vague discomfort in midline, progressing to focal tenderness in right lower quadrant.  She reported she has had multiple episodes of nausea, vomiting, however continues to pass gas.  Upon admission to ED, CT abdomen pelvis obtained, demonstrating appendicolith right lower quadrant, with dilation of the appendix to 1.1 cm with free fluid.     Exam demonstrating focal right lower quadrant tenderness, no obvious guarding, or rebound tenderness.      Labs on admission demosntrating leukocytosis to 12.6,.      Patient denies prior abdominal surgeries, previous cardiac/pulmonary conditions.  Denies smoking.    Patient was brought to the OR with Dr Daugherty on 5/28 for Laparoscopic appendectomy. Skin was closed with tissue glue.     On POD#1 patient doing well, pain well controlled, tolerating PO and ambulatory.       Discharged home with clinic follow up scheduled at time of discharge.  Patient declining oxycodone at time of discharge. Provided our clinic phone number at time of discharge for any questions or concerns.      Time spent>30 minutes  Discussed with Dr Chahal        Pertinent Physical Exam At Time of Discharge  /59 (BP Location: Right arm, Patient Position: Lying)   Pulse 75   Temp 36.3 °C (97.3 °F) (Temporal)   Resp 14   Ht 1.676 m (5' 6\")   Wt 69.2 kg (152 lb 8.9 oz)   SpO2 94%   BMI 24.62 kg/m²       Physical Exam    PHYSICAL EXAM:  Physical Exam  Constitutional:       General: She is " not in acute distress.     Appearance: She is well appearing. She is not toxic-appearing.   HENT:      Head: Normocephalic and atraumatic.      Mouth/Throat:      Pharynx: No oropharyngeal exudate or posterior oropharyngeal erythema.   Eyes:      Extraocular Movements: Extraocular movements intact.      Pupils: Pupils are equal, round, and reactive to light.   Cardiovascular:      Rate and Rhythm: Normal rate.      Heart sounds: No murmur heard.  Abdominal:      Tenderness: There is incisional tenderness, appropriate post op ttp  Mild distension, tissue glue  Musculoskeletal:         General: No deformity.      Cervical back: No rigidity.   Skin:     General: Skin is warm and dry.   Neurological:      General: No focal deficit present.      Mental Status: She is alert.               Home Medications     Medication List      START taking these medications     acetaminophen 325 mg tablet; Commonly known as: Tylenol; Take 2 tablets   (650 mg) by mouth every 6 hours for 14 days.   oxyCODONE 5 mg immediate release tablet; Commonly known as: Roxicodone;   Take 1 tablet (5 mg) by mouth every 6 hours if needed for severe pain (7 -   10).     CONTINUE taking these medications     cetirizine 10 mg capsule; Commonly known as: ZYRTEC   cholecalciferol 25 mcg (1,000 units) capsule; Commonly known as: Vitamin   D-3   levonorgestrel 20 mcg/24hr IUD; Commonly known as: Mirena   rosuvastatin 20 mg tablet; Commonly known as: Crestor; Take 1 tablet (20   mg) by mouth once daily.     STOP taking these medications     ibuprofen capsule; Commonly known as: Motrin       Outpatient Follow-Up  Future Appointments   Date Time Provider Department Center   6/11/2025 12:00 PM [facility] [id number] [facility] [id number] Academic   6/16/2025  1:30 PM Anika Angeles MD [id number] Academic   3/6/2026  8:00 AM Mei Wheeler DO [id number] Jeremy Reid PA-C

## 2025-05-30 ENCOUNTER — PATIENT OUTREACH (OUTPATIENT)
Dept: PRIMARY CARE | Facility: CLINIC | Age: 55
End: 2025-05-30
Payer: COMMERCIAL

## 2025-05-30 ENCOUNTER — HOSPITAL ENCOUNTER (INPATIENT)
Facility: HOSPITAL | Age: 55
LOS: 2 days | Discharge: HOME | End: 2025-06-04
Attending: EMERGENCY MEDICINE | Admitting: SURGERY
Payer: COMMERCIAL

## 2025-05-30 ENCOUNTER — CLINICAL SUPPORT (OUTPATIENT)
Dept: EMERGENCY MEDICINE | Facility: HOSPITAL | Age: 55
End: 2025-05-30
Payer: COMMERCIAL

## 2025-05-30 DIAGNOSIS — K35.30 ACUTE APPENDICITIS WITH LOCALIZED PERITONITIS, WITHOUT PERFORATION, ABSCESS, OR GANGRENE: ICD-10-CM

## 2025-05-30 DIAGNOSIS — G89.18 POST-OP PAIN: Primary | ICD-10-CM

## 2025-05-30 DIAGNOSIS — K65.1 POSTOPERATIVE INTRA-ABDOMINAL ABSCESS: ICD-10-CM

## 2025-05-30 DIAGNOSIS — T81.43XA POSTOPERATIVE INTRA-ABDOMINAL ABSCESS: ICD-10-CM

## 2025-05-30 LAB
ANION GAP BLDV CALCULATED.4IONS-SCNC: 9 MMOL/L (ref 10–25)
BASE EXCESS BLDV CALC-SCNC: 6.7 MMOL/L (ref -2–3)
BODY TEMPERATURE: 37 DEGREES CELSIUS
CA-I BLDV-SCNC: 1.14 MMOL/L (ref 1.1–1.33)
CHLORIDE BLDV-SCNC: 100 MMOL/L (ref 98–107)
GLUCOSE BLDV-MCNC: 114 MG/DL (ref 74–99)
HCO3 BLDV-SCNC: 31.3 MMOL/L (ref 22–26)
HCT VFR BLD EST: 35 % (ref 36–46)
HGB BLDV-MCNC: 11.7 G/DL (ref 12–16)
INHALED O2 CONCENTRATION: 21 %
LACTATE BLDV-SCNC: 1.1 MMOL/L (ref 0.4–2)
OXYHGB MFR BLDV: 68 % (ref 45–75)
PCO2 BLDV: 44 MM HG (ref 41–51)
PH BLDV: 7.46 PH (ref 7.33–7.43)
PO2 BLDV: 39 MM HG (ref 35–45)
POTASSIUM BLDV-SCNC: 3.2 MMOL/L (ref 3.5–5.3)
SAO2 % BLDV: 69 % (ref 45–75)
SODIUM BLDV-SCNC: 137 MMOL/L (ref 136–145)

## 2025-05-30 PROCEDURE — 82435 ASSAY OF BLOOD CHLORIDE: CPT

## 2025-05-30 PROCEDURE — 36415 COLL VENOUS BLD VENIPUNCTURE: CPT

## 2025-05-30 PROCEDURE — 96365 THER/PROPH/DIAG IV INF INIT: CPT

## 2025-05-30 PROCEDURE — 85025 COMPLETE CBC W/AUTO DIFF WBC: CPT

## 2025-05-30 PROCEDURE — 99285 EMERGENCY DEPT VISIT HI MDM: CPT | Performed by: EMERGENCY MEDICINE

## 2025-05-30 PROCEDURE — 83605 ASSAY OF LACTIC ACID: CPT

## 2025-05-30 PROCEDURE — 84132 ASSAY OF SERUM POTASSIUM: CPT

## 2025-05-30 PROCEDURE — 83735 ASSAY OF MAGNESIUM: CPT

## 2025-05-30 PROCEDURE — 87040 BLOOD CULTURE FOR BACTERIA: CPT

## 2025-05-30 PROCEDURE — 83690 ASSAY OF LIPASE: CPT

## 2025-05-30 PROCEDURE — 96375 TX/PRO/DX INJ NEW DRUG ADDON: CPT

## 2025-05-30 PROCEDURE — 93005 ELECTROCARDIOGRAM TRACING: CPT

## 2025-05-30 PROCEDURE — 2500000004 HC RX 250 GENERAL PHARMACY W/ HCPCS (ALT 636 FOR OP/ED): Mod: JZ

## 2025-05-30 RX ORDER — MORPHINE SULFATE 4 MG/ML
8 INJECTION INTRAVENOUS ONCE
Status: DISCONTINUED | OUTPATIENT
Start: 2025-05-30 | End: 2025-05-30

## 2025-05-30 RX ORDER — METHOCARBAMOL 100 MG/ML
500 INJECTION, SOLUTION INTRAMUSCULAR; INTRAVENOUS ONCE
Status: COMPLETED | OUTPATIENT
Start: 2025-05-30 | End: 2025-05-30

## 2025-05-30 RX ORDER — ACETAMINOPHEN 10 MG/ML
1000 INJECTION, SOLUTION INTRAVENOUS ONCE
Status: COMPLETED | OUTPATIENT
Start: 2025-05-30 | End: 2025-05-31

## 2025-05-30 RX ORDER — ONDANSETRON HYDROCHLORIDE 2 MG/ML
4 INJECTION, SOLUTION INTRAVENOUS ONCE
Status: COMPLETED | OUTPATIENT
Start: 2025-05-30 | End: 2025-05-30

## 2025-05-30 RX ORDER — ACETAMINOPHEN 500 MG
TABLET ORAL EVERY 6 HOURS PRN
Status: ON HOLD | COMMUNITY

## 2025-05-30 RX ADMIN — HYDROMORPHONE HYDROCHLORIDE 0.5 MG: 1 INJECTION, SOLUTION INTRAMUSCULAR; INTRAVENOUS; SUBCUTANEOUS at 23:47

## 2025-05-30 RX ADMIN — ONDANSETRON 4 MG: 2 INJECTION INTRAMUSCULAR; INTRAVENOUS at 23:48

## 2025-05-30 RX ADMIN — SODIUM CHLORIDE, POTASSIUM CHLORIDE, SODIUM LACTATE AND CALCIUM CHLORIDE 1000 ML: 600; 310; 30; 20 INJECTION, SOLUTION INTRAVENOUS at 23:47

## 2025-05-30 RX ADMIN — ACETAMINOPHEN 1000 MG: 1000 INJECTION, SOLUTION INTRAVENOUS at 23:48

## 2025-05-30 RX ADMIN — METHOCARBAMOL 500 MG: 1000 INJECTION, SOLUTION INTRAMUSCULAR; INTRAVENOUS at 23:48

## 2025-05-30 ASSESSMENT — COLUMBIA-SUICIDE SEVERITY RATING SCALE - C-SSRS
6. HAVE YOU EVER DONE ANYTHING, STARTED TO DO ANYTHING, OR PREPARED TO DO ANYTHING TO END YOUR LIFE?: NO
2. HAVE YOU ACTUALLY HAD ANY THOUGHTS OF KILLING YOURSELF?: NO
1. IN THE PAST MONTH, HAVE YOU WISHED YOU WERE DEAD OR WISHED YOU COULD GO TO SLEEP AND NOT WAKE UP?: NO

## 2025-05-30 ASSESSMENT — PAIN - FUNCTIONAL ASSESSMENT: PAIN_FUNCTIONAL_ASSESSMENT: 0-10

## 2025-05-30 ASSESSMENT — PAIN DESCRIPTION - ORIENTATION: ORIENTATION: RIGHT

## 2025-05-30 ASSESSMENT — PAIN DESCRIPTION - LOCATION: LOCATION: ABDOMEN

## 2025-05-30 ASSESSMENT — PAIN SCALES - GENERAL: PAINLEVEL_OUTOF10: 8

## 2025-05-30 NOTE — PROGRESS NOTES
Outreach made for post discharge follow up. At time of call, the patient stated she is feeling improved. She is taking Tylenol with relief from pain. Patient declined primary care hospital follow up. She will be following up with general surgery post-operatively.     Discharge Facility: [facility]  Discharge Diagnosis: Acute appendicitis with localized peritonitis, without perforation, abscess, or gangrene   Admission Date: 5/28/2025  Procedure Date: 5/28/2025 Appendectomy, laparoscopic  Discharge Date: 5/29/2025    PCP Appointment Date Declined to schedule  Specialist Appointment Date: Appointment (06/10/2025) General Surgery Post-Op  Hospital Encounter and Summary Linked: Yes  ED to Hosp-Admission (Discharged) with Juancho Torres MD; Dallas Verduzco MD; Mildred Jang MD (05/28/2025)

## 2025-05-31 ENCOUNTER — APPOINTMENT (OUTPATIENT)
Dept: RADIOLOGY | Facility: HOSPITAL | Age: 55
End: 2025-05-31
Payer: COMMERCIAL

## 2025-05-31 PROBLEM — G89.18 POST-OP PAIN: Status: ACTIVE | Noted: 2025-05-31

## 2025-05-31 LAB
ALBUMIN SERPL BCP-MCNC: 3.6 G/DL (ref 3.4–5)
ALBUMIN SERPL BCP-MCNC: 4 G/DL (ref 3.4–5)
ALP SERPL-CCNC: 56 U/L (ref 33–110)
ALT SERPL W P-5'-P-CCNC: 11 U/L (ref 7–45)
ANION GAP SERPL CALC-SCNC: 12 MMOL/L (ref 10–20)
ANION GAP SERPL CALC-SCNC: 15 MMOL/L (ref 10–20)
APPEARANCE UR: CLEAR
AST SERPL W P-5'-P-CCNC: 14 U/L (ref 9–39)
BASOPHILS # BLD AUTO: 0.02 X10*3/UL (ref 0–0.1)
BASOPHILS NFR BLD AUTO: 0.2 %
BILIRUB SERPL-MCNC: 0.9 MG/DL (ref 0–1.2)
BILIRUB UR STRIP.AUTO-MCNC: NEGATIVE MG/DL
BUN SERPL-MCNC: 10 MG/DL (ref 6–23)
BUN SERPL-MCNC: 8 MG/DL (ref 6–23)
CALCIUM SERPL-MCNC: 8.9 MG/DL (ref 8.6–10.6)
CALCIUM SERPL-MCNC: 9.1 MG/DL (ref 8.6–10.6)
CHLORIDE SERPL-SCNC: 100 MMOL/L (ref 98–107)
CHLORIDE SERPL-SCNC: 102 MMOL/L (ref 98–107)
CO2 SERPL-SCNC: 29 MMOL/L (ref 21–32)
CO2 SERPL-SCNC: 30 MMOL/L (ref 21–32)
COLOR UR: YELLOW
CREAT SERPL-MCNC: 0.69 MG/DL (ref 0.5–1.05)
CREAT SERPL-MCNC: 0.72 MG/DL (ref 0.5–1.05)
EGFRCR SERPLBLD CKD-EPI 2021: >90 ML/MIN/1.73M*2
EGFRCR SERPLBLD CKD-EPI 2021: >90 ML/MIN/1.73M*2
EOSINOPHIL # BLD AUTO: 0 X10*3/UL (ref 0–0.7)
EOSINOPHIL NFR BLD AUTO: 0 %
ERYTHROCYTE [DISTWIDTH] IN BLOOD BY AUTOMATED COUNT: 12.1 % (ref 11.5–14.5)
ERYTHROCYTE [DISTWIDTH] IN BLOOD BY AUTOMATED COUNT: 12.1 % (ref 11.5–14.5)
GLUCOSE SERPL-MCNC: 109 MG/DL (ref 74–99)
GLUCOSE SERPL-MCNC: 92 MG/DL (ref 74–99)
GLUCOSE UR STRIP.AUTO-MCNC: NORMAL MG/DL
HCT VFR BLD AUTO: 33.3 % (ref 36–46)
HCT VFR BLD AUTO: 35.7 % (ref 36–46)
HGB BLD-MCNC: 11.5 G/DL (ref 12–16)
HGB BLD-MCNC: 12.1 G/DL (ref 12–16)
HOLD SPECIMEN: NORMAL
IMM GRANULOCYTES # BLD AUTO: 0.07 X10*3/UL (ref 0–0.7)
IMM GRANULOCYTES NFR BLD AUTO: 0.7 % (ref 0–0.9)
KETONES UR STRIP.AUTO-MCNC: NEGATIVE MG/DL
LACTATE SERPL-SCNC: 1 MMOL/L (ref 0.4–2)
LEUKOCYTE ESTERASE UR QL STRIP.AUTO: NEGATIVE
LIPASE SERPL-CCNC: 15 U/L (ref 9–82)
LYMPHOCYTES # BLD AUTO: 0.88 X10*3/UL (ref 1.2–4.8)
LYMPHOCYTES NFR BLD AUTO: 9.2 %
MAGNESIUM SERPL-MCNC: 1.9 MG/DL (ref 1.6–2.4)
MAGNESIUM SERPL-MCNC: 1.93 MG/DL (ref 1.6–2.4)
MCH RBC QN AUTO: 29.4 PG (ref 26–34)
MCH RBC QN AUTO: 29.6 PG (ref 26–34)
MCHC RBC AUTO-ENTMCNC: 33.9 G/DL (ref 32–36)
MCHC RBC AUTO-ENTMCNC: 34.5 G/DL (ref 32–36)
MCV RBC AUTO: 86 FL (ref 80–100)
MCV RBC AUTO: 87 FL (ref 80–100)
MONOCYTES # BLD AUTO: 0.58 X10*3/UL (ref 0.1–1)
MONOCYTES NFR BLD AUTO: 6 %
NEUTROPHILS # BLD AUTO: 8.05 X10*3/UL (ref 1.2–7.7)
NEUTROPHILS NFR BLD AUTO: 83.9 %
NITRITE UR QL STRIP.AUTO: NEGATIVE
NRBC BLD-RTO: 0 /100 WBCS (ref 0–0)
NRBC BLD-RTO: 0 /100 WBCS (ref 0–0)
PH UR STRIP.AUTO: 7.5 [PH]
PHOSPHATE SERPL-MCNC: 3 MG/DL (ref 2.5–4.9)
PLATELET # BLD AUTO: 228 X10*3/UL (ref 150–450)
PLATELET # BLD AUTO: 230 X10*3/UL (ref 150–450)
POTASSIUM SERPL-SCNC: 3.3 MMOL/L (ref 3.5–5.3)
POTASSIUM SERPL-SCNC: 4 MMOL/L (ref 3.5–5.3)
PROT SERPL-MCNC: 6.5 G/DL (ref 6.4–8.2)
PROT UR STRIP.AUTO-MCNC: ABNORMAL MG/DL
RBC # BLD AUTO: 3.89 X10*6/UL (ref 4–5.2)
RBC # BLD AUTO: 4.12 X10*6/UL (ref 4–5.2)
RBC # UR STRIP.AUTO: NEGATIVE MG/DL
RBC #/AREA URNS AUTO: ABNORMAL /HPF
SODIUM SERPL-SCNC: 140 MMOL/L (ref 136–145)
SODIUM SERPL-SCNC: 141 MMOL/L (ref 136–145)
SP GR UR STRIP.AUTO: >1.05
SQUAMOUS #/AREA URNS AUTO: ABNORMAL /HPF
UROBILINOGEN UR STRIP.AUTO-MCNC: ABNORMAL MG/DL
WBC # BLD AUTO: 10.1 X10*3/UL (ref 4.4–11.3)
WBC # BLD AUTO: 9.6 X10*3/UL (ref 4.4–11.3)
WBC #/AREA URNS AUTO: ABNORMAL /HPF

## 2025-05-31 PROCEDURE — G0378 HOSPITAL OBSERVATION PER HR: HCPCS

## 2025-05-31 PROCEDURE — 74177 CT ABD & PELVIS W/CONTRAST: CPT | Performed by: STUDENT IN AN ORGANIZED HEALTH CARE EDUCATION/TRAINING PROGRAM

## 2025-05-31 PROCEDURE — 36415 COLL VENOUS BLD VENIPUNCTURE: CPT

## 2025-05-31 PROCEDURE — 2500000001 HC RX 250 WO HCPCS SELF ADMINISTERED DRUGS (ALT 637 FOR MEDICARE OP)

## 2025-05-31 PROCEDURE — 96367 TX/PROPH/DG ADDL SEQ IV INF: CPT

## 2025-05-31 PROCEDURE — 2550000001 HC RX 255 CONTRASTS: Performed by: EMERGENCY MEDICINE

## 2025-05-31 PROCEDURE — 2500000004 HC RX 250 GENERAL PHARMACY W/ HCPCS (ALT 636 FOR OP/ED): Mod: JZ

## 2025-05-31 PROCEDURE — 85027 COMPLETE CBC AUTOMATED: CPT

## 2025-05-31 PROCEDURE — 96366 THER/PROPH/DIAG IV INF ADDON: CPT

## 2025-05-31 PROCEDURE — 81001 URINALYSIS AUTO W/SCOPE: CPT

## 2025-05-31 PROCEDURE — 83735 ASSAY OF MAGNESIUM: CPT

## 2025-05-31 PROCEDURE — 74177 CT ABD & PELVIS W/CONTRAST: CPT

## 2025-05-31 PROCEDURE — 96372 THER/PROPH/DIAG INJ SC/IM: CPT

## 2025-05-31 PROCEDURE — 96376 TX/PRO/DX INJ SAME DRUG ADON: CPT

## 2025-05-31 PROCEDURE — 96361 HYDRATE IV INFUSION ADD-ON: CPT

## 2025-05-31 PROCEDURE — 80069 RENAL FUNCTION PANEL: CPT

## 2025-05-31 RX ORDER — OXYCODONE HYDROCHLORIDE 5 MG/1
5 TABLET ORAL EVERY 6 HOURS PRN
Status: DISCONTINUED | OUTPATIENT
Start: 2025-05-31 | End: 2025-05-31

## 2025-05-31 RX ORDER — SODIUM CHLORIDE, SODIUM LACTATE, POTASSIUM CHLORIDE, CALCIUM CHLORIDE 600; 310; 30; 20 MG/100ML; MG/100ML; MG/100ML; MG/100ML
100 INJECTION, SOLUTION INTRAVENOUS CONTINUOUS
Status: ACTIVE | OUTPATIENT
Start: 2025-05-31 | End: 2025-06-01

## 2025-05-31 RX ORDER — POLYETHYLENE GLYCOL 3350 17 G/17G
17 POWDER, FOR SOLUTION ORAL DAILY
Status: CANCELLED | OUTPATIENT
Start: 2025-05-31

## 2025-05-31 RX ORDER — POLYETHYLENE GLYCOL 3350 17 G/17G
17 POWDER, FOR SOLUTION ORAL DAILY
Status: DISCONTINUED | OUTPATIENT
Start: 2025-05-31 | End: 2025-06-04 | Stop reason: HOSPADM

## 2025-05-31 RX ORDER — POTASSIUM CHLORIDE 14.9 MG/ML
20 INJECTION INTRAVENOUS
Status: COMPLETED | OUTPATIENT
Start: 2025-05-31 | End: 2025-05-31

## 2025-05-31 RX ORDER — ONDANSETRON HYDROCHLORIDE 2 MG/ML
4 INJECTION, SOLUTION INTRAVENOUS EVERY 8 HOURS PRN
Status: DISCONTINUED | OUTPATIENT
Start: 2025-05-31 | End: 2025-06-04 | Stop reason: HOSPADM

## 2025-05-31 RX ORDER — ONDANSETRON 4 MG/1
4 TABLET, ORALLY DISINTEGRATING ORAL EVERY 8 HOURS PRN
Status: DISCONTINUED | OUTPATIENT
Start: 2025-05-31 | End: 2025-06-04 | Stop reason: HOSPADM

## 2025-05-31 RX ORDER — ACETAMINOPHEN 325 MG/1
500 TABLET ORAL EVERY 6 HOURS
Status: DISCONTINUED | OUTPATIENT
Start: 2025-05-31 | End: 2025-06-04 | Stop reason: HOSPADM

## 2025-05-31 RX ORDER — NALOXONE HYDROCHLORIDE 0.4 MG/ML
0.2 INJECTION, SOLUTION INTRAMUSCULAR; INTRAVENOUS; SUBCUTANEOUS EVERY 5 MIN PRN
Status: DISCONTINUED | OUTPATIENT
Start: 2025-05-31 | End: 2025-06-04 | Stop reason: HOSPADM

## 2025-05-31 RX ORDER — CHOLECALCIFEROL (VITAMIN D3) 25 MCG
25 TABLET ORAL DAILY
Status: DISCONTINUED | OUTPATIENT
Start: 2025-05-31 | End: 2025-06-04 | Stop reason: HOSPADM

## 2025-05-31 RX ORDER — OXYCODONE HYDROCHLORIDE 5 MG/1
10 TABLET ORAL EVERY 6 HOURS PRN
Refills: 0 | Status: DISCONTINUED | OUTPATIENT
Start: 2025-05-31 | End: 2025-06-04 | Stop reason: HOSPADM

## 2025-05-31 RX ORDER — ROSUVASTATIN CALCIUM 20 MG/1
20 TABLET, COATED ORAL DAILY
Status: DISCONTINUED | OUTPATIENT
Start: 2025-05-31 | End: 2025-06-04 | Stop reason: HOSPADM

## 2025-05-31 RX ORDER — OXYCODONE HYDROCHLORIDE 5 MG/1
5 TABLET ORAL EVERY 6 HOURS PRN
Refills: 0 | Status: DISCONTINUED | OUTPATIENT
Start: 2025-05-31 | End: 2025-06-04 | Stop reason: HOSPADM

## 2025-05-31 RX ORDER — ENOXAPARIN SODIUM 100 MG/ML
40 INJECTION SUBCUTANEOUS DAILY
Status: DISCONTINUED | OUTPATIENT
Start: 2025-05-31 | End: 2025-06-04 | Stop reason: HOSPADM

## 2025-05-31 RX ADMIN — ENOXAPARIN SODIUM 40 MG: 100 INJECTION SUBCUTANEOUS at 09:27

## 2025-05-31 RX ADMIN — ACETAMINOPHEN 487.5 MG: 325 TABLET ORAL at 21:10

## 2025-05-31 RX ADMIN — OXYCODONE 10 MG: 5 TABLET ORAL at 12:21

## 2025-05-31 RX ADMIN — POTASSIUM CHLORIDE 20 MEQ: 14.9 INJECTION, SOLUTION INTRAVENOUS at 02:54

## 2025-05-31 RX ADMIN — OXYCODONE 5 MG: 5 TABLET ORAL at 18:33

## 2025-05-31 RX ADMIN — HYDROMORPHONE HYDROCHLORIDE 0.5 MG: 1 INJECTION, SOLUTION INTRAMUSCULAR; INTRAVENOUS; SUBCUTANEOUS at 02:53

## 2025-05-31 RX ADMIN — PIPERACILLIN SODIUM AND TAZOBACTAM SODIUM 3.38 G: 3; .375 INJECTION, SOLUTION INTRAVENOUS at 05:39

## 2025-05-31 RX ADMIN — OXYCODONE 5 MG: 5 TABLET ORAL at 06:17

## 2025-05-31 RX ADMIN — SODIUM CHLORIDE, POTASSIUM CHLORIDE, SODIUM LACTATE AND CALCIUM CHLORIDE 1000 ML: 600; 310; 30; 20 INJECTION, SOLUTION INTRAVENOUS at 02:53

## 2025-05-31 RX ADMIN — IOHEXOL 75 ML: 350 INJECTION, SOLUTION INTRAVENOUS at 01:16

## 2025-05-31 RX ADMIN — SODIUM CHLORIDE, POTASSIUM CHLORIDE, SODIUM LACTATE AND CALCIUM CHLORIDE 1000 ML: 600; 310; 30; 20 INJECTION, SOLUTION INTRAVENOUS at 00:43

## 2025-05-31 RX ADMIN — SODIUM CHLORIDE, POTASSIUM CHLORIDE, SODIUM LACTATE AND CALCIUM CHLORIDE 100 ML/HR: 600; 310; 30; 20 INJECTION, SOLUTION INTRAVENOUS at 05:07

## 2025-05-31 RX ADMIN — PIPERACILLIN SODIUM AND TAZOBACTAM SODIUM 4.5 G: 4; .5 INJECTION, SOLUTION INTRAVENOUS at 00:20

## 2025-05-31 RX ADMIN — ACETAMINOPHEN 487.5 MG: 325 TABLET ORAL at 04:12

## 2025-05-31 RX ADMIN — ACETAMINOPHEN 487.5 MG: 325 TABLET ORAL at 15:12

## 2025-05-31 RX ADMIN — PIPERACILLIN SODIUM AND TAZOBACTAM SODIUM 3.38 G: 3; .375 INJECTION, SOLUTION INTRAVENOUS at 12:22

## 2025-05-31 RX ADMIN — POLYETHYLENE GLYCOL 3350 17 G: 17 POWDER, FOR SOLUTION ORAL at 09:27

## 2025-05-31 RX ADMIN — POTASSIUM CHLORIDE 20 MEQ: 14.9 INJECTION, SOLUTION INTRAVENOUS at 04:47

## 2025-05-31 RX ADMIN — PIPERACILLIN SODIUM AND TAZOBACTAM SODIUM 3.38 G: 3; .375 INJECTION, SOLUTION INTRAVENOUS at 18:30

## 2025-05-31 SDOH — ECONOMIC STABILITY: HOUSING INSECURITY: IN THE PAST 12 MONTHS, HOW MANY TIMES HAVE YOU MOVED WHERE YOU WERE LIVING?: 0

## 2025-05-31 SDOH — ECONOMIC STABILITY: TRANSPORTATION INSECURITY: IN THE PAST 12 MONTHS, HAS LACK OF TRANSPORTATION KEPT YOU FROM MEDICAL APPOINTMENTS OR FROM GETTING MEDICATIONS?: NO

## 2025-05-31 SDOH — ECONOMIC STABILITY: HOUSING INSECURITY: IN THE LAST 12 MONTHS, WAS THERE A TIME WHEN YOU WERE NOT ABLE TO PAY THE MORTGAGE OR RENT ON TIME?: NO

## 2025-05-31 SDOH — SOCIAL STABILITY: SOCIAL INSECURITY: WITHIN THE LAST YEAR, HAVE YOU BEEN AFRAID OF YOUR PARTNER OR EX-PARTNER?: NO

## 2025-05-31 SDOH — ECONOMIC STABILITY: FOOD INSECURITY: WITHIN THE PAST 12 MONTHS, YOU WORRIED THAT YOUR FOOD WOULD RUN OUT BEFORE YOU GOT THE MONEY TO BUY MORE.: NEVER TRUE

## 2025-05-31 SDOH — SOCIAL STABILITY: SOCIAL INSECURITY: WITHIN THE LAST YEAR, HAVE YOU BEEN HUMILIATED OR EMOTIONALLY ABUSED IN OTHER WAYS BY YOUR PARTNER OR EX-PARTNER?: NO

## 2025-05-31 SDOH — ECONOMIC STABILITY: INCOME INSECURITY: IN THE PAST 12 MONTHS HAS THE ELECTRIC, GAS, OIL, OR WATER COMPANY THREATENED TO SHUT OFF SERVICES IN YOUR HOME?: NO

## 2025-05-31 SDOH — ECONOMIC STABILITY: FOOD INSECURITY: WITHIN THE PAST 12 MONTHS, THE FOOD YOU BOUGHT JUST DIDN'T LAST AND YOU DIDN'T HAVE MONEY TO GET MORE.: NEVER TRUE

## 2025-05-31 SDOH — SOCIAL STABILITY: SOCIAL INSECURITY: ARE THERE ANY APPARENT SIGNS OF INJURIES/BEHAVIORS THAT COULD BE RELATED TO ABUSE/NEGLECT?: NO

## 2025-05-31 SDOH — ECONOMIC STABILITY: HOUSING INSECURITY: AT ANY TIME IN THE PAST 12 MONTHS, WERE YOU HOMELESS OR LIVING IN A SHELTER (INCLUDING NOW)?: NO

## 2025-05-31 SDOH — ECONOMIC STABILITY: FOOD INSECURITY: HOW HARD IS IT FOR YOU TO PAY FOR THE VERY BASICS LIKE FOOD, HOUSING, MEDICAL CARE, AND HEATING?: NOT VERY HARD

## 2025-05-31 SDOH — SOCIAL STABILITY: SOCIAL INSECURITY: DO YOU FEEL ANYONE HAS EXPLOITED OR TAKEN ADVANTAGE OF YOU FINANCIALLY OR OF YOUR PERSONAL PROPERTY?: NO

## 2025-05-31 SDOH — SOCIAL STABILITY: SOCIAL INSECURITY: DO YOU FEEL UNSAFE GOING BACK TO THE PLACE WHERE YOU ARE LIVING?: NO

## 2025-05-31 SDOH — SOCIAL STABILITY: SOCIAL INSECURITY: WERE YOU ABLE TO COMPLETE ALL THE BEHAVIORAL HEALTH SCREENINGS?: YES

## 2025-05-31 SDOH — SOCIAL STABILITY: SOCIAL INSECURITY: HAS ANYONE EVER THREATENED TO HURT YOUR FAMILY OR YOUR PETS?: NO

## 2025-05-31 SDOH — SOCIAL STABILITY: SOCIAL INSECURITY: HAVE YOU HAD THOUGHTS OF HARMING ANYONE ELSE?: NO

## 2025-05-31 SDOH — SOCIAL STABILITY: SOCIAL INSECURITY: DOES ANYONE TRY TO KEEP YOU FROM HAVING/CONTACTING OTHER FRIENDS OR DOING THINGS OUTSIDE YOUR HOME?: NO

## 2025-05-31 SDOH — SOCIAL STABILITY: SOCIAL INSECURITY: ARE YOU OR HAVE YOU BEEN THREATENED OR ABUSED PHYSICALLY, EMOTIONALLY, OR SEXUALLY BY ANYONE?: NO

## 2025-05-31 SDOH — SOCIAL STABILITY: SOCIAL INSECURITY: ABUSE: ADULT

## 2025-05-31 SDOH — SOCIAL STABILITY: SOCIAL INSECURITY: HAVE YOU HAD ANY THOUGHTS OF HARMING ANYONE ELSE?: NO

## 2025-05-31 ASSESSMENT — ACTIVITIES OF DAILY LIVING (ADL)
ADEQUATE_TO_COMPLETE_ADL: YES
LACK_OF_TRANSPORTATION: NO
TOILETING: INDEPENDENT
BATHING: INDEPENDENT
FEEDING YOURSELF: INDEPENDENT
DRESSING YOURSELF: INDEPENDENT
GROOMING: INDEPENDENT
HEARING - RIGHT EAR: FUNCTIONAL
LACK_OF_TRANSPORTATION: NO
JUDGMENT_ADEQUATE_SAFELY_COMPLETE_DAILY_ACTIVITIES: YES
LACK_OF_TRANSPORTATION: NO
WALKS IN HOME: INDEPENDENT
HEARING - LEFT EAR: FUNCTIONAL
ASSISTIVE_DEVICE: CONTACTS
PATIENT'S MEMORY ADEQUATE TO SAFELY COMPLETE DAILY ACTIVITIES?: YES

## 2025-05-31 ASSESSMENT — LIFESTYLE VARIABLES
HOW OFTEN DO YOU HAVE A DRINK CONTAINING ALCOHOL: NEVER
TOTAL SCORE: 0
EVER FELT BAD OR GUILTY ABOUT YOUR DRINKING: NO
SKIP TO QUESTIONS 9-10: 1
AUDIT-C TOTAL SCORE: 0
HAVE YOU EVER FELT YOU SHOULD CUT DOWN ON YOUR DRINKING: NO
HAVE PEOPLE ANNOYED YOU BY CRITICIZING YOUR DRINKING: NO
HOW OFTEN DO YOU HAVE 6 OR MORE DRINKS ON ONE OCCASION: NEVER
HOW MANY STANDARD DRINKS CONTAINING ALCOHOL DO YOU HAVE ON A TYPICAL DAY: PATIENT DOES NOT DRINK
AUDIT-C TOTAL SCORE: 0
EVER HAD A DRINK FIRST THING IN THE MORNING TO STEADY YOUR NERVES TO GET RID OF A HANGOVER: NO

## 2025-05-31 ASSESSMENT — COGNITIVE AND FUNCTIONAL STATUS - GENERAL
MOBILITY SCORE: 24
PATIENT BASELINE BEDBOUND: NO
DAILY ACTIVITIY SCORE: 24

## 2025-05-31 ASSESSMENT — PATIENT HEALTH QUESTIONNAIRE - PHQ9
SUM OF ALL RESPONSES TO PHQ9 QUESTIONS 1 & 2: 0
2. FEELING DOWN, DEPRESSED OR HOPELESS: NOT AT ALL
1. LITTLE INTEREST OR PLEASURE IN DOING THINGS: NOT AT ALL

## 2025-05-31 ASSESSMENT — PAIN SCALES - GENERAL
PAINLEVEL_OUTOF10: 7
PAINLEVEL_OUTOF10: 5 - MODERATE PAIN
PAINLEVEL_OUTOF10: 0 - NO PAIN
PAINLEVEL_OUTOF10: 5 - MODERATE PAIN
PAINLEVEL_OUTOF10: 5 - MODERATE PAIN
PAINLEVEL_OUTOF10: 7

## 2025-05-31 ASSESSMENT — PAIN DESCRIPTION - LOCATION: LOCATION: ABDOMEN

## 2025-05-31 ASSESSMENT — PAIN - FUNCTIONAL ASSESSMENT
PAIN_FUNCTIONAL_ASSESSMENT: 0-10

## 2025-05-31 ASSESSMENT — PAIN DESCRIPTION - DESCRIPTORS: DESCRIPTORS: ACHING

## 2025-05-31 ASSESSMENT — PAIN DESCRIPTION - ORIENTATION: ORIENTATION: RIGHT

## 2025-05-31 NOTE — ED PROVIDER NOTES
History of Present Illness     History provided by: Patient  Limitations to History: None  External Records Reviewed with Brief Summary: Recent ED visit on 5/28/2025  for right lower abdominal pain and was found to have acute appendicitis.  Required laparoscopic appendectomy, and was discharged home the following day.  Of note, her initial imaging did demonstrate dilation of the appendix with localized peritonitis without any abscess formation at the time of surgery.    HPI:  Cecilia Pryor is a 55 y.o. female with recent history of appendectomy on 5/28/2025 presents the emergency department with persistent postop pain.  Patient states initially her pain did get better after she was discharged home.  However her pain started again today and has become progressively worse.  She states that she has had a decrease in appetite, and additional nausea without any episodes of emesis.  She notes that she has not had a bowel movement since her surgery.  Declines having daily bowel movements, notes usually has bowel movements every 2 to 3 days.  Patient notes some increased lower abdominal pressure as well but declines any dysuria, or hematuria.  She does note that she has had intermittent fevers at home today, and is concerned that she could have an infection.    Physical Exam   Triage vitals:  T 37 °C (98.6 °F)  HR (!) 110  /88  RR 16  O2 95 % None (Room air)    GEN:  Awake, alert, in moderate acute pain distress.  HEENT: Normocephalic, atraumatic. Gaze conjugate. No scleral icterus or injection. Moist mucous membranes.  CARDIO: Tachycardic rate and regular rhythm. No murmur. Radial pulses 2+ bilaterally.   PULM: Clear to auscultation bilaterally. Speaking in full sentences. No accessory muscle use or stridor.  GI: Soft, mild diffuse abdominal tenderness with the worst pain at the right lower quadrant and suprapubically, non-distended.  Significant rebound tenderness and guarding,  concern for peritonitis.  SKIN:  Warm and dry. Color appropriate. No rashes, contusions, or wounds.  MSK: ROM intact in all 4 extremities without contractures or pain. No peripheral edema.  NEURO: No focal findings identified. No confusion or gross mental status changes. Face symmetric. Speech is fluent.   PSYCH: Appropriate mood and behavior, converses and responds appropriately during exam.    Medical Decision Making & ED Course   Medical Decision Makin y.o. female with recent history of appendectomy presenting to the emergency department with worsening abdominal pain and fevers concerning for postop infection.  Lab work was obtained including blood cultures and she was started on Zosyn for suspected postop infection.  CT imaging demonstrated Pelvic peritonitis in addition to early abscess formation.  I discussed with acute care surgery, who recommended admission to their service for further management.  I discussed this with patient, agreeable to this.  She was given IV fluids and pain medication as well in addition to Tylenol IV.  Remained hemodynamically stable in the ED.     --      Differential diagnoses considered include but are not limited to: See ED course and MDM      Social Determinants of Health which Significantly Impact Care: None identified     EKG Independent Interpretation: See ED course for interpretation of EKG    Independent Result Review and Interpretation:  See ED course and MDM for interpretation of results and interpretation    Chronic conditions affecting the patient's care: See ED course and MDM for interpretation of chronic conditions.    The patient was discussed with the following consultants/services: General surgery who recommended admission for further IV antibiotics and observation in the setting of early abscess collection s/p appendectomy    Care Considerations: As documented in ED course and MDM.    ED Course:  ED Course as of 25 0859   Fri May 30, 2025   2300 On my evaluation, patient is  significantly warm, oral temperature is 101.3.  She is currently tachycardic, meets criteria for sepsis at this time.  Will obtain blood cultures and initiate Zosyn for potential intra-abdominal infection with concern for intra-abdominal postop infection. [AD]      ED Course User Index  [AD] Caridad Forbes DO         Diagnoses as of 05/31/25 0886   Post-op pain   Postoperative intra-abdominal abscess       Disposition   As a result of their workup, the patient will require admission to the hospital.  The patient was informed of her diagnosis.  The patient was given the opportunity to ask questions and I answered them. The patient agreed to be admitted to the hospital.    Procedures   Procedures    Patient seen and discussed with ED attending physician.    Caridad Forbes DO  Emergency Medicine     Caridad Forbes DO  Resident  05/31/25 7853     tachycardia with narrow complex, no acute ST changes.        MD Crystal Jay MD  06/15/25 5627

## 2025-05-31 NOTE — ED TRIAGE NOTES
PT presents to ED via triage for chief complaint of post op complication. PT states she had an appendectomy on Wednesday night. PT was discharged home yesterday. PT states she is having increased right sided abdominal pain and nausea. PT also endorsing fever and chill and states she was 101 degrees at home. PT denies any medical history. PT is [employer].

## 2025-05-31 NOTE — H&P
[facility]  ACUTE CARE SURGERY - HISTORY AND PHYSICAL / CONSULT    Patient Name: Cecilia Pryor  MRN: [id number]  Admit Date: 530  : 1970  AGE: 55 y.o.   GENDER: female  ==============================================================================  TODAY'S ASSESSMENT AND PLAN OF CARE:  Cecilia Pryor is a 55 y.o. year old female patient with a past medical history of hyperlipidemia, appendicitis status post laparoscopic appendectomy on  returning today with worsening abdominal pain.  In the emergency department she is tachycardic to 110, afebrile and normotensive. No leukocytosis on labs. CT AP demonstrating small fluid collections within the pelvis as well as inflammatory wall thickening of ileal loops and rectosigmoid colon    Plan:  - admit to acs for observation  - serial abdominal exam  - trial of diet  - pain control  - IVF  - AM labs    Patient discussed with fellow Dr. Calderon and Attending Physician Dr. Brannon.    Holly Kwong MD PGY-2  Acute Care Surgery [id number]    ==============================================================================  CHIEF COMPLAINT/REASON FOR CONSULT:  eCcilia Pryor is a 55 y.o. year old female patient with a past medical history of hyperlipidemia, appendicitis status post laparoscopic appendectomy on  returning today with worsening abdominal pain.  Patient was discharged from the hospital on  after her laparoscopic appendectomy, was doing well, tolerating p.o. intake.  States that this morning she started having worsening right-sided abdominal pain as well as some associated nausea but no vomiting.  Endorses fevers at home with Tmax of 101.  Last bowel movement was , she has been passing gas.  Has been tolerating water without issue, not taking in significant p.o. intake as she does not have an appetite.  Acute care surgery consulted for additional conditions.    PAST MEDICAL HISTORY:   PMH:   Medical History[1]  PSH:    Surgical History[2]  FH:   Family History[3]  SOCIAL HISTORY:    Smoking:    Tobacco Use History[4]    Alcohol:    Social History     Substance and Sexual Activity   Alcohol Use Yes    Alcohol/week: 2.0 - 3.0 standard drinks of alcohol    Types: 2 - 3 Standard drinks or equivalent per week       Drug use: denies    MEDICATIONS:   Prior to Admission medications    Medication Sig Start Date End Date Taking? Authorizing Provider   acetaminophen (Tylenol) 500 mg tablet Take by mouth every 6 hours if needed for mild pain (1 - 3).    Historical Provider, MD   cetirizine (ZYRTEC) 10 mg capsule Take 1 capsule (10 mg) by mouth once daily as needed.    Historical Provider, MD   cholecalciferol (Vitamin D-3) 25 MCG (1000 UT) capsule Take 1 capsule (25 mcg) by mouth once daily. 10/19/22   Historical Provider, MD   levonorgestrel (Mirena) 21 mcg/24 hours (8 yrs) 52 mg IUD 52 mg by intrauterine route.    Historical Provider, MD   rosuvastatin (Crestor) 20 mg tablet Take 1 tablet (20 mg) by mouth once daily. 2/28/25   Mei Wheeler DO   acetaminophen (Tylenol) 325 mg tablet Take 2 tablets (650 mg) by mouth every 6 hours for 14 days. 5/29/25 5/29/25  Zaira Reid PA-C   ibuprofen (Motrin) capsule Take 3 capsules (600 mg) by mouth once daily as needed.  5/29/25  Historical Provider, MD   oxyCODONE (Roxicodone) 5 mg immediate release tablet Take 1 tablet (5 mg) by mouth every 6 hours if needed for severe pain (7 - 10). 5/29/25 5/29/25  Zaira Reid PA-C     ALLERGIES:   RX Allergies[5]    REVIEW OF SYSTEMS:  A 10 point review of systems was conducted, pertinent positives per HPI  PHYSICAL EXAM:  Neurological: Awake, alert, uncomfortable appearing  Respiratory/Thorax: even, symmetric chest rise, no accessory muscle use, unlabored on room air  GI: soft, non-distended, tender to palpation in the right abdomen, surgical incisions well approximated without erythema or drainage  : voiding  Skin: warm, dry  Musculoskeletal:  SWEET x 4  Extremities: no edema   Psychological: appropriate mood/affect    IMAGING SUMMARY:    CT AP pending    LABS:  Results from last 7 days   Lab Units 05/30/25 2324 05/29/25 0546 05/28/25  0558   WBC AUTO x10*3/uL 9.6 11.1 12.6*   HEMOGLOBIN g/dL 12.1 12.2 13.7   HEMATOCRIT % 35.7* 37.8 38.7   PLATELETS AUTO x10*3/uL 228 204 254   NEUTROS PCT AUTO % 83.9  --  87.4   LYMPHS PCT AUTO % 9.2  --  4.4   MONOS PCT AUTO % 6.0  --  7.5   EOS PCT AUTO % 0.0  --  0.2         Results from last 7 days   Lab Units 05/30/25 2324 05/29/25 0546 05/28/25  0558   SODIUM mmol/L 141 139 135*   POTASSIUM mmol/L 3.3* 4.1 3.7   CHLORIDE mmol/L 100 102 100   CO2 mmol/L 29 30 24   BUN mg/dL 10 13 14   CREATININE mg/dL 0.69 0.78 0.63   CALCIUM mg/dL 9.1 8.9 9.7   PROTEIN TOTAL g/dL 6.5  --  7.4   BILIRUBIN TOTAL mg/dL 0.9  --  1.0   ALK PHOS U/L 56  --  66   ALT U/L 11  --  11   AST U/L 14  --  18   GLUCOSE mg/dL 109* 152* 116*     Results from last 7 days   Lab Units 05/30/25 2324 05/28/25  0558   BILIRUBIN TOTAL mg/dL 0.9 1.0             I have reviewed all laboratory and imaging results ordered/pertinent for this encounter.          [1]   Past Medical History:  Diagnosis Date    BRCA negative     BRCA1 negative 2019    BRCA2 negative 2019    Encounter for full-term uncomplicated delivery     Spontaneous vaginal delivery    Family history of ischemic heart disease 05/08/2024    Personal history of colonic polyps 10/19/2022    History of adenomatous polyp of colon    Personal history of other malignant neoplasm of skin     History of other malignant neoplasm of skin   [2]   Past Surgical History:  Procedure Laterality Date    ANTERIOR CRUCIATE LIGAMENT REPAIR  05/13/2014    Primary Repair Of Knee Ligament Cruciate Anterior    BREAST SURGERY  05/13/2014    Breast Surgery Reduction Procedure Bilateral    OTHER SURGICAL HISTORY  05/13/2014    Knee Arthroscopy With Medial Collateral Ligament Repair    OTHER SURGICAL HISTORY   10/19/2022    Mohs surgery    OTHER SURGICAL HISTORY  10/19/2022    Colonoscopy    REDUCTION MAMMAPLASTY  1990   [3]   Family History  Problem Relation Name Age of Onset    Atrial fibrillation Mother      Coronary artery disease Father          stents age 60    Melanoma Father      Breast cancer Mother's Sister      Ovarian cancer Father's Sister      Breast cancer Father's Sister      Heart attack Father's Brother          fatal age 50s    Heart attack Maternal Grandfather          fatal in his 50s   [4]   Social History  Tobacco Use   Smoking Status Never   Smokeless Tobacco Never   [5]   Allergies  Allergen Reactions    Pollen Extracts Itching

## 2025-05-31 NOTE — PROGRESS NOTES
05/31/25 1902   Discharge Planning   Living Arrangements Spouse/significant other;Children   Support Systems Spouse/significant other   Assistance Needed none   Type of Residence Private residence   Number of Stairs to Enter Residence 4   Number of Stairs Within Residence 13   Do you have animals or pets at home? No   Who is requesting discharge planning? Other (Comment)  (TCC assessment)   Home or Post Acute Services None   Expected Discharge Disposition Home   Does the patient need discharge transport arranged? No   Financial Resource Strain   How hard is it for you to pay for the very basics like food, housing, medical care, and heating? Not very   Housing Stability   In the last 12 months, was there a time when you were not able to pay the mortgage or rent on time? N   In the past 12 months, how many times have you moved where you were living? 0   At any time in the past 12 months, were you homeless or living in a shelter (including now)? N   Transportation Needs   In the past 12 months, has lack of transportation kept you from medical appointments or from getting medications? no   In the past 12 months, has lack of transportation kept you from meetings, work, or from getting things needed for daily living? No   Intensity of Service   Intensity of Service 0-30 min     Transitional Care Coordinator Note: Met with patient to discuss discharge planning s/p admission.  Patient lives home with spouse( Reilly) as listed.  Independent in ADL's. Requires no assist devices for ambulation.  Patient denies active home care or home care needs.  Demographics and contact information confirmed.  Will continue to monitor patient for all home going needs.  Chiara Cedillo RN TCC via Epic.    Falls- none  Equipment -none  HC -none  Picc/Port- none  SW- none  O2/Cpap- none  Diabetes- none   Dialysis- none  PCP- Dr. Mei Wheeler-  last seen [id number]  [facility]- [facility]   Transport at discharge- spouse   Therapy  Recommendations-none at this time, readmitted for fevers

## 2025-05-31 NOTE — PROGRESS NOTES
Transitional Care Coordination Progress Note:  This nurse attempted to speak with pt to discuss discharge planning needs. Pt asleep.     Heather Jameson, RN, BSN  Transitional Care Coordinator  Office: [phone]  Secure chat via Haiku

## 2025-06-01 LAB
ALBUMIN SERPL BCP-MCNC: 3.3 G/DL (ref 3.4–5)
ANION GAP SERPL CALC-SCNC: 10 MMOL/L (ref 10–20)
BUN SERPL-MCNC: 8 MG/DL (ref 6–23)
CALCIUM SERPL-MCNC: 8.6 MG/DL (ref 8.6–10.6)
CHLORIDE SERPL-SCNC: 100 MMOL/L (ref 98–107)
CO2 SERPL-SCNC: 33 MMOL/L (ref 21–32)
CREAT SERPL-MCNC: 0.73 MG/DL (ref 0.5–1.05)
EGFRCR SERPLBLD CKD-EPI 2021: >90 ML/MIN/1.73M*2
ERYTHROCYTE [DISTWIDTH] IN BLOOD BY AUTOMATED COUNT: 12.2 % (ref 11.5–14.5)
GLUCOSE SERPL-MCNC: 85 MG/DL (ref 74–99)
HCT VFR BLD AUTO: 33.2 % (ref 36–46)
HGB BLD-MCNC: 11 G/DL (ref 12–16)
MAGNESIUM SERPL-MCNC: 1.9 MG/DL (ref 1.6–2.4)
MCH RBC QN AUTO: 29.6 PG (ref 26–34)
MCHC RBC AUTO-ENTMCNC: 33.1 G/DL (ref 32–36)
MCV RBC AUTO: 89 FL (ref 80–100)
NRBC BLD-RTO: 0 /100 WBCS (ref 0–0)
PHOSPHATE SERPL-MCNC: 3.5 MG/DL (ref 2.5–4.9)
PLATELET # BLD AUTO: 228 X10*3/UL (ref 150–450)
POTASSIUM SERPL-SCNC: 3.4 MMOL/L (ref 3.5–5.3)
RBC # BLD AUTO: 3.72 X10*6/UL (ref 4–5.2)
SODIUM SERPL-SCNC: 140 MMOL/L (ref 136–145)
WBC # BLD AUTO: 8.6 X10*3/UL (ref 4.4–11.3)

## 2025-06-01 PROCEDURE — 83735 ASSAY OF MAGNESIUM: CPT

## 2025-06-01 PROCEDURE — 36415 COLL VENOUS BLD VENIPUNCTURE: CPT

## 2025-06-01 PROCEDURE — G0378 HOSPITAL OBSERVATION PER HR: HCPCS

## 2025-06-01 PROCEDURE — 96372 THER/PROPH/DIAG INJ SC/IM: CPT

## 2025-06-01 PROCEDURE — 85027 COMPLETE CBC AUTOMATED: CPT

## 2025-06-01 PROCEDURE — 2500000004 HC RX 250 GENERAL PHARMACY W/ HCPCS (ALT 636 FOR OP/ED)

## 2025-06-01 PROCEDURE — 2500000002 HC RX 250 W HCPCS SELF ADMINISTERED DRUGS (ALT 637 FOR MEDICARE OP, ALT 636 FOR OP/ED)

## 2025-06-01 PROCEDURE — 2500000001 HC RX 250 WO HCPCS SELF ADMINISTERED DRUGS (ALT 637 FOR MEDICARE OP)

## 2025-06-01 PROCEDURE — 80069 RENAL FUNCTION PANEL: CPT

## 2025-06-01 PROCEDURE — 2500000004 HC RX 250 GENERAL PHARMACY W/ HCPCS (ALT 636 FOR OP/ED): Mod: JZ

## 2025-06-01 RX ORDER — POTASSIUM CHLORIDE 20 MEQ/1
40 TABLET, EXTENDED RELEASE ORAL ONCE
Status: COMPLETED | OUTPATIENT
Start: 2025-06-01 | End: 2025-06-01

## 2025-06-01 RX ORDER — MAGNESIUM SULFATE HEPTAHYDRATE 40 MG/ML
2 INJECTION, SOLUTION INTRAVENOUS ONCE
Status: COMPLETED | OUTPATIENT
Start: 2025-06-01 | End: 2025-06-01

## 2025-06-01 RX ADMIN — ACETAMINOPHEN 487.5 MG: 325 TABLET ORAL at 08:54

## 2025-06-01 RX ADMIN — MAGNESIUM SULFATE HEPTAHYDRATE 2 G: 40 INJECTION, SOLUTION INTRAVENOUS at 13:28

## 2025-06-01 RX ADMIN — ACETAMINOPHEN 487.5 MG: 325 TABLET ORAL at 04:18

## 2025-06-01 RX ADMIN — POTASSIUM CHLORIDE 40 MEQ: 1500 TABLET, EXTENDED RELEASE ORAL at 08:53

## 2025-06-01 RX ADMIN — OXYCODONE 10 MG: 5 TABLET ORAL at 21:45

## 2025-06-01 RX ADMIN — OXYCODONE 5 MG: 5 TABLET ORAL at 08:54

## 2025-06-01 RX ADMIN — ACETAMINOPHEN 487.5 MG: 325 TABLET ORAL at 20:58

## 2025-06-01 RX ADMIN — PIPERACILLIN SODIUM AND TAZOBACTAM SODIUM 3.38 G: 3; .375 INJECTION, SOLUTION INTRAVENOUS at 00:06

## 2025-06-01 RX ADMIN — ENOXAPARIN SODIUM 40 MG: 100 INJECTION SUBCUTANEOUS at 08:55

## 2025-06-01 RX ADMIN — POTASSIUM CHLORIDE 40 MEQ: 1500 TABLET, EXTENDED RELEASE ORAL at 17:35

## 2025-06-01 RX ADMIN — Medication 25 MCG: at 08:53

## 2025-06-01 RX ADMIN — PIPERACILLIN SODIUM AND TAZOBACTAM SODIUM 3.38 G: 3; .375 INJECTION, SOLUTION INTRAVENOUS at 12:38

## 2025-06-01 RX ADMIN — OXYCODONE 5 MG: 5 TABLET ORAL at 15:31

## 2025-06-01 RX ADMIN — PIPERACILLIN SODIUM AND TAZOBACTAM SODIUM 3.38 G: 3; .375 INJECTION, SOLUTION INTRAVENOUS at 18:45

## 2025-06-01 RX ADMIN — ROSUVASTATIN CALCIUM 20 MG: 20 TABLET, FILM COATED ORAL at 08:53

## 2025-06-01 RX ADMIN — PIPERACILLIN SODIUM AND TAZOBACTAM SODIUM 3.38 G: 3; .375 INJECTION, SOLUTION INTRAVENOUS at 23:54

## 2025-06-01 RX ADMIN — PIPERACILLIN SODIUM AND TAZOBACTAM SODIUM 3.38 G: 3; .375 INJECTION, SOLUTION INTRAVENOUS at 05:12

## 2025-06-01 RX ADMIN — OXYCODONE 10 MG: 5 TABLET ORAL at 00:45

## 2025-06-01 RX ADMIN — ACETAMINOPHEN 487.5 MG: 325 TABLET ORAL at 16:36

## 2025-06-01 ASSESSMENT — PAIN - FUNCTIONAL ASSESSMENT
PAIN_FUNCTIONAL_ASSESSMENT: 0-10

## 2025-06-01 ASSESSMENT — PAIN SCALES - GENERAL
PAINLEVEL_OUTOF10: 7
PAINLEVEL_OUTOF10: 7
PAINLEVEL_OUTOF10: 8
PAINLEVEL_OUTOF10: 8
PAINLEVEL_OUTOF10: 7
PAINLEVEL_OUTOF10: 9
PAINLEVEL_OUTOF10: 7

## 2025-06-01 NOTE — PROGRESS NOTES
[facility]  ACUTE CARE SURGERY - PROGRESS NOTE    Patient Name: Cecilia Pryor  MRN: [id number]  Admit Date: 530  : 1970  AGE: 55 y.o.   GENDER: female  ==============================================================================  TODAY'S ASSESSMENT AND PLAN OF CARE:  Cecilia Pryor is a 55 y.o. year old female patient with a past medical history of hyperlipidemia, appendicitis status post laparoscopic appendectomy on  returning today with worsening abdominal pain.  In the emergency department she is tachycardic to 110, afebrile and normotensive. No leukocytosis on labs. CT AP demonstrating small fluid collections within the pelvis as well as inflammatory wall thickening of ileal loops and rectosigmoid colon    Concern for ongoing abscess formation.  Will continue treatment with IV antibiotics, trial of diet.  If no improvement, will consider repeat scan with possible IR drainage of abscess.    NEURO:   - Multimodal pain control with tylenol, PO oxycodone  CV:  - Vitals x 8hrs  - No indication for telemetry  -Continue home Crestor  PULM:  - ISS,  GI:  - Okay for regular diet  -Zofran as needed nausea  -MiraLAX bowel regimen  :  - Replete electrolytes to potassium of 4.0, magnesium of 2.0.  - Voiding spontaneously.  HEME/ID:  - Continue Zosyn for intra-abdominal infection    F: PO fluids   E: Replete as needed   N: Regular diet   GI ppx: None  DVT ppx: Lovenox 40 daily     Dispo: Continue current level of care.      Patient's exam, labs, and findings discussed with Dr. Chahal, who agrees with the plan as described above.    Tomy Lofton MD  PGY-1 General Surgery  Acute Care Surgery [id number]    Subjective   ==============================================================================  CHIEF COMPLAINT / EVENTS LAST 24HRS / HPI:  No acute events overnight. Patient seen and evaluated this AM during team rounds.  Per patient, pain slightly improved compared to  yesterday.  Tolerating diet, however not able to ambulate much.  Denies nausea, vomiting.    MEDICAL HISTORY / ROS:   Admission history reviewed. Pertinent changes as follows:  None.       Objective   Vital Signs past 24h:  Heart Rate:  [88-90]   Temp:  [36.4 °C (97.5 °F)-36.6 °C (97.9 °F)]   Resp:  [16-17]   BP: (122-125)/(57-67)   SpO2:  [93 %-98 %]     I/O past 24h:  I/O last 2 completed shifts:  In: 1000 (14.5 mL/kg) [I.V.:1000 (14.5 mL/kg)]  Out: - (0 mL/kg)   Weight: 69.2 kg      PHYSICAL EXAM:  GEN: No acute distress. Alert, awake and conversive.  HEENT: Sclera anicteric. Moist mucous membranes.  RESP: Breathing non-labored, equal chest rise. On RA.  CV: Regular rate, normotensive  GI: Abdomen soft, nondistended, mildly tender to palpation without rigidity or guarding   : Voiding spontaneously.  MSK: No gross deformities. Moves all extremities spontaneously.  NEURO: Alert and oriented x3. No focal deficits.  PSYCH: Appropriate mood and affect.  SKIN: No rashes or lesions.    Labs past 24h:  Results for orders placed or performed during the hospital encounter of 05/30/25 (from the past 24 hours)   Magnesium   Result Value Ref Range    Magnesium 1.90 1.60 - 2.40 mg/dL   Renal Function Panel   Result Value Ref Range    Glucose 85 74 - 99 mg/dL    Sodium 140 136 - 145 mmol/L    Potassium 3.4 (L) 3.5 - 5.3 mmol/L    Chloride 100 98 - 107 mmol/L    Bicarbonate 33 (H) 21 - 32 mmol/L    Anion Gap 10 10 - 20 mmol/L    Urea Nitrogen 8 6 - 23 mg/dL    Creatinine 0.73 0.50 - 1.05 mg/dL    eGFR >90 >60 mL/min/1.73m*2    Calcium 8.6 8.6 - 10.6 mg/dL    Phosphorus 3.5 2.5 - 4.9 mg/dL    Albumin 3.3 (L) 3.4 - 5.0 g/dL   CBC   Result Value Ref Range    WBC 8.6 4.4 - 11.3 x10*3/uL    nRBC 0.0 0.0 - 0.0 /100 WBCs    RBC 3.72 (L) 4.00 - 5.20 x10*6/uL    Hemoglobin 11.0 (L) 12.0 - 16.0 g/dL    Hematocrit 33.2 (L) 36.0 - 46.0 %    MCV 89 80 - 100 fL    MCH 29.6 26.0 - 34.0 pg    MCHC 33.1 32.0 - 36.0 g/dL    RDW 12.2 11.5 - 14.5  %    Platelets 228 150 - 450 x10*3/uL       Imaging within past 24h:  Imaging  CT abdomen pelvis w IV contrast  Result Date: 5/31/2025  1. Findings consistent with pelvic peritonitis with developing loculated collections in the cul-de-sac measuring 3.9 cm x 3.7 cm and 1.9 cm x 3.3 cm favored to represent early abscess formation, an additional collection potentially measuring 2.3 cm x 1.4 cm in the right pelvic sidewall. There is inflammatory wall thickening of numerous locoregional ileal loops and of the rectosigmoid colon. The etiology of this is not clear but the possibility of leak at the appendectomy site is not excluded.   2. Trace pneumoperitoneum and free peritoneal air likely related to recent postoperative status.   3. Additional chronic nonacute findings as above.   I personally reviewed the images/study and I agree with radiology resident Dr. Jonathan Ashley findings as stated. This study was interpreted at [place], [place]   MACRO: None.   Signed by: Maikel Hinkle 5/31/2025 3:16 AM Dictation workstation:   [id number]      Cardiology, Vascular, and Other Imaging  No other imaging results found for the past 2 days      I have reviewed the imaging above as it pertains to the patient's surgical concerns and agree with the radiologist's interpretation.

## 2025-06-02 LAB
ALBUMIN SERPL BCP-MCNC: 3.5 G/DL (ref 3.4–5)
ANION GAP SERPL CALC-SCNC: 10 MMOL/L (ref 10–20)
BUN SERPL-MCNC: 8 MG/DL (ref 6–23)
CALCIUM SERPL-MCNC: 9.1 MG/DL (ref 8.6–10.6)
CHLORIDE SERPL-SCNC: 101 MMOL/L (ref 98–107)
CO2 SERPL-SCNC: 32 MMOL/L (ref 21–32)
CREAT SERPL-MCNC: 0.67 MG/DL (ref 0.5–1.05)
EGFRCR SERPLBLD CKD-EPI 2021: >90 ML/MIN/1.73M*2
ERYTHROCYTE [DISTWIDTH] IN BLOOD BY AUTOMATED COUNT: 12.2 % (ref 11.5–14.5)
GLUCOSE SERPL-MCNC: 82 MG/DL (ref 74–99)
HCT VFR BLD AUTO: 35.8 % (ref 36–46)
HGB BLD-MCNC: 11.4 G/DL (ref 12–16)
MAGNESIUM SERPL-MCNC: 2.12 MG/DL (ref 1.6–2.4)
MCH RBC QN AUTO: 28.6 PG (ref 26–34)
MCHC RBC AUTO-ENTMCNC: 31.8 G/DL (ref 32–36)
MCV RBC AUTO: 90 FL (ref 80–100)
NRBC BLD-RTO: 0 /100 WBCS (ref 0–0)
PHOSPHATE SERPL-MCNC: 4.2 MG/DL (ref 2.5–4.9)
PLATELET # BLD AUTO: 272 X10*3/UL (ref 150–450)
POTASSIUM SERPL-SCNC: 3.7 MMOL/L (ref 3.5–5.3)
RBC # BLD AUTO: 3.98 X10*6/UL (ref 4–5.2)
SODIUM SERPL-SCNC: 139 MMOL/L (ref 136–145)
WBC # BLD AUTO: 8.2 X10*3/UL (ref 4.4–11.3)

## 2025-06-02 PROCEDURE — 2500000001 HC RX 250 WO HCPCS SELF ADMINISTERED DRUGS (ALT 637 FOR MEDICARE OP)

## 2025-06-02 PROCEDURE — 84100 ASSAY OF PHOSPHORUS: CPT

## 2025-06-02 PROCEDURE — 85027 COMPLETE CBC AUTOMATED: CPT

## 2025-06-02 PROCEDURE — 2500000004 HC RX 250 GENERAL PHARMACY W/ HCPCS (ALT 636 FOR OP/ED)

## 2025-06-02 PROCEDURE — 1100000001 HC PRIVATE ROOM DAILY

## 2025-06-02 PROCEDURE — 83735 ASSAY OF MAGNESIUM: CPT

## 2025-06-02 PROCEDURE — 80069 RENAL FUNCTION PANEL: CPT

## 2025-06-02 PROCEDURE — 36415 COLL VENOUS BLD VENIPUNCTURE: CPT

## 2025-06-02 PROCEDURE — 96372 THER/PROPH/DIAG INJ SC/IM: CPT

## 2025-06-02 PROCEDURE — 2500000002 HC RX 250 W HCPCS SELF ADMINISTERED DRUGS (ALT 637 FOR MEDICARE OP, ALT 636 FOR OP/ED)

## 2025-06-02 PROCEDURE — 99232 SBSQ HOSP IP/OBS MODERATE 35: CPT | Performed by: NURSE PRACTITIONER

## 2025-06-02 RX ADMIN — ACETAMINOPHEN 487.5 MG: 325 TABLET ORAL at 03:03

## 2025-06-02 RX ADMIN — ROSUVASTATIN CALCIUM 20 MG: 20 TABLET, FILM COATED ORAL at 08:58

## 2025-06-02 RX ADMIN — PIPERACILLIN SODIUM AND TAZOBACTAM SODIUM 3.38 G: 3; .375 INJECTION, SOLUTION INTRAVENOUS at 18:25

## 2025-06-02 RX ADMIN — PIPERACILLIN SODIUM AND TAZOBACTAM SODIUM 3.38 G: 3; .375 INJECTION, SOLUTION INTRAVENOUS at 05:57

## 2025-06-02 RX ADMIN — Medication 25 MCG: at 08:58

## 2025-06-02 RX ADMIN — ACETAMINOPHEN 487.5 MG: 325 TABLET ORAL at 15:26

## 2025-06-02 RX ADMIN — ENOXAPARIN SODIUM 40 MG: 100 INJECTION SUBCUTANEOUS at 08:58

## 2025-06-02 RX ADMIN — OXYCODONE 5 MG: 5 TABLET ORAL at 03:59

## 2025-06-02 RX ADMIN — OXYCODONE 5 MG: 5 TABLET ORAL at 19:24

## 2025-06-02 RX ADMIN — ACETAMINOPHEN 487.5 MG: 325 TABLET ORAL at 08:58

## 2025-06-02 RX ADMIN — OXYCODONE 10 MG: 5 TABLET ORAL at 12:39

## 2025-06-02 RX ADMIN — ACETAMINOPHEN 487.5 MG: 325 TABLET ORAL at 21:43

## 2025-06-02 RX ADMIN — PIPERACILLIN SODIUM AND TAZOBACTAM SODIUM 3.38 G: 3; .375 INJECTION, SOLUTION INTRAVENOUS at 11:56

## 2025-06-02 ASSESSMENT — COGNITIVE AND FUNCTIONAL STATUS - GENERAL
MOBILITY SCORE: 24
DAILY ACTIVITIY SCORE: 24

## 2025-06-02 ASSESSMENT — PAIN SCALES - GENERAL
PAINLEVEL_OUTOF10: 7
PAINLEVEL_OUTOF10: 5 - MODERATE PAIN
PAINLEVEL_OUTOF10: 6
PAINLEVEL_OUTOF10: 6
PAINLEVEL_OUTOF10: 2
PAINLEVEL_OUTOF10: 6
PAINLEVEL_OUTOF10: 7

## 2025-06-02 ASSESSMENT — PAIN - FUNCTIONAL ASSESSMENT
PAIN_FUNCTIONAL_ASSESSMENT: 0-10

## 2025-06-02 NOTE — PROGRESS NOTES
[facility]  ACUTE CARE SURGERY - PROGRESS NOTE    Patient Name: Cecilia Pryor  MRN: [id number]  Admit Date: 530  : 1970  AGE: 55 y.o.   GENDER: female  ==============================================================================  TODAY'S ASSESSMENT AND PLAN OF CARE:  Cecilia Pryor is a 55 y.o. year old female patient with a past medical history of hyperlipidemia, appendicitis status post laparoscopic appendectomy on  returning today with worsening abdominal pain.  In the emergency department she is tachycardic to 110, afebrile and normotensive. No leukocytosis on labs. CT AP demonstrating small fluid collections within the pelvis as well as inflammatory wall thickening of ileal loops and rectosigmoid colon    Concern for ongoing abscess formation.  Will continue treatment with IV antibiotics, trial of diet.  If no improvement, will consider repeat scan with possible IR drainage of abscess.    NEURO:   - Multimodal pain control with tylenol, PO oxycodone  - continue close monitoring of abdominal pain   CV:  - Vitals x 8hrs  - No indication for telemetry  -Continue home Crestor  PULM:  - ISS,  GI:  - Okay for regular diet as tolerated.   -Zofran as needed nausea  -MiraLAX bowel regimen  :  - Replete electrolytes to potassium of 4.0, magnesium of 2.0.  HEME/ID:  - Continue Zosyn for intra-abdominal infection    F: PO fluids   E: Replete as needed   N: Regular diet   GI ppx: None  DVT ppx: Lovenox 40 daily     Dispo: Continue current level of care.    Patient discussed with Attending Dr. Trinity [facility]  Acute Care Surgery [id number]    Total time spent on assessment and plan of care approximately thirty minutes     Subjective   ==============================================================================  CHIEF COMPLAINT / EVENTS LAST 24HRS / HPI:  No acute events overnight. Patient seen and evaluated this AM during team rounds.  Per patient, pain  slightly better than yesterday. Tolerating small bites of food at a time but states has diarrhea after eating. Endorsing mild nausea but has not needed Zofran.     MEDICAL HISTORY / ROS:   Admission history reviewed. Pertinent changes as follows:  None.       Objective   Vital Signs past 24h:  Heart Rate:  [81-88]   Temp:  [36.2 °C (97.2 °F)-37.6 °C (99.6 °F)]   Resp:  [16]   BP: (117-153)/(69-82)   SpO2:  [96 %-97 %]     I/O past 24h:  I/O last 2 completed shifts:  In: 50 (0.7 mL/kg) [I.V.:50 (0.7 mL/kg)]  Out: - (0 mL/kg)   Weight: 69.2 kg      PHYSICAL EXAM:  GEN: No acute distress. Alert, awake and conversive.  HEENT: Sclera anicteric. Moist mucous membranes.  RESP: Breathing non-labored, equal chest rise. On RA.  CV: Regular rate,   GI: Abdomen soft, nondistended, RLQ tenderness to deep palpitation, no guarding. Incisions well approximated  : deferred  MSK: No gross deformities. Moves all extremities spontaneously.  NEURO: Alert and oriented x3. No focal deficits.  PSYCH: Appropriate mood and affect.  SKIN: No rashes or lesions.    Labs past 24h:  No results found for this or any previous visit (from the past 24 hours).      Imaging within past 24h:  Imaging  No results found.      Cardiology, Vascular, and Other Imaging  No other imaging results found for the past 2 days

## 2025-06-02 NOTE — CARE PLAN
The clinical goals for the shift included pain management.    Problem: Pain - Adult  Goal: Verbalizes/displays adequate comfort level or baseline comfort level  Outcome: Progressing     Problem: Safety - Adult  Goal: Free from fall injury  Outcome: Progressing     Problem: Discharge Planning  Goal: Discharge to home or other facility with appropriate resources  Outcome: Progressing     Problem: Chronic Conditions and Co-morbidities  Goal: Patient's chronic conditions and co-morbidity symptoms are monitored and maintained or improved  Outcome: Progressing     Problem: Nutrition  Goal: Nutrient intake appropriate for maintaining nutritional needs  Outcome: Progressing     Problem: Pain  Goal: Takes deep breaths with improved pain control throughout the shift  Outcome: Met  Goal: Turns in bed with improved pain control throughout the shift  Outcome: Met  Goal: Walks with improved pain control throughout the shift  Outcome: Met  Goal: Performs ADL's with improved pain control throughout shift  Outcome: Met  Goal: Free from opioid side effects throughout the shift  Outcome: Met  Goal: Free from acute confusion related to pain meds throughout the shift  Outcome: Met     Problem: Fall/Injury  Goal: Not fall by end of shift  Outcome: Met  Goal: Be free from injury by end of the shift  Outcome: Met

## 2025-06-02 NOTE — CARE PLAN
The patient's goals for the shift include      The clinical goals for the shift include pt to remain stable    Over the shift, the patient did  make progress toward the following goals.   Problem: Pain - Adult  Goal: Verbalizes/displays adequate comfort level or baseline comfort level  Outcome: Progressing     Problem: Safety - Adult  Goal: Free from fall injury  Outcome: Progressing     Problem: Chronic Conditions and Co-morbidities  Goal: Patient's chronic conditions and co-morbidity symptoms are monitored and maintained or improved  Outcome: Progressing

## 2025-06-02 NOTE — PROGRESS NOTES
06/02/25 1253   Discharge Planning   Expected Discharge Disposition Home H     Transitional Care Coordination Progress Note:  Plan per Medical/Surgical team: Concern for ongoing abscess formation.  Will continue treatment with IV antibiotics, trial of diet.  If no improvement, will consider repeat scan with possible IR drainage of abscess.  Discharge Disposition: Anticipate home with [facility].  Potential Barriers:  none  Patient Class: Inpatient  Financial Class: [email or web address].   ADOD: 6/5    Heathre Jameson RN, BSN  Transitional Care Coordinator  Office: [phone]  Secure chat via [employer]

## 2025-06-03 LAB
ALBUMIN SERPL BCP-MCNC: 3.5 G/DL (ref 3.4–5)
ANION GAP SERPL CALC-SCNC: 16 MMOL/L (ref 10–20)
BUN SERPL-MCNC: 8 MG/DL (ref 6–23)
CALCIUM SERPL-MCNC: 9 MG/DL (ref 8.6–10.6)
CHLORIDE SERPL-SCNC: 100 MMOL/L (ref 98–107)
CO2 SERPL-SCNC: 28 MMOL/L (ref 21–32)
CREAT SERPL-MCNC: 0.74 MG/DL (ref 0.5–1.05)
EGFRCR SERPLBLD CKD-EPI 2021: >90 ML/MIN/1.73M*2
ERYTHROCYTE [DISTWIDTH] IN BLOOD BY AUTOMATED COUNT: 12.1 % (ref 11.5–14.5)
GLUCOSE SERPL-MCNC: 83 MG/DL (ref 74–99)
HCT VFR BLD AUTO: 35.6 % (ref 36–46)
HGB BLD-MCNC: 11.7 G/DL (ref 12–16)
MAGNESIUM SERPL-MCNC: 2.12 MG/DL (ref 1.6–2.4)
MCH RBC QN AUTO: 29.6 PG (ref 26–34)
MCHC RBC AUTO-ENTMCNC: 32.9 G/DL (ref 32–36)
MCV RBC AUTO: 90 FL (ref 80–100)
NRBC BLD-RTO: 0 /100 WBCS (ref 0–0)
PHOSPHATE SERPL-MCNC: 4.6 MG/DL (ref 2.5–4.9)
PLATELET # BLD AUTO: 315 X10*3/UL (ref 150–450)
POTASSIUM SERPL-SCNC: 3.6 MMOL/L (ref 3.5–5.3)
RBC # BLD AUTO: 3.95 X10*6/UL (ref 4–5.2)
SODIUM SERPL-SCNC: 140 MMOL/L (ref 136–145)
WBC # BLD AUTO: 7.6 X10*3/UL (ref 4.4–11.3)

## 2025-06-03 PROCEDURE — 2500000004 HC RX 250 GENERAL PHARMACY W/ HCPCS (ALT 636 FOR OP/ED): Performed by: NURSE PRACTITIONER

## 2025-06-03 PROCEDURE — 2500000001 HC RX 250 WO HCPCS SELF ADMINISTERED DRUGS (ALT 637 FOR MEDICARE OP): Performed by: NURSE PRACTITIONER

## 2025-06-03 PROCEDURE — 1100000001 HC PRIVATE ROOM DAILY

## 2025-06-03 PROCEDURE — 83735 ASSAY OF MAGNESIUM: CPT

## 2025-06-03 PROCEDURE — 99232 SBSQ HOSP IP/OBS MODERATE 35: CPT | Performed by: NURSE PRACTITIONER

## 2025-06-03 PROCEDURE — 2500000001 HC RX 250 WO HCPCS SELF ADMINISTERED DRUGS (ALT 637 FOR MEDICARE OP)

## 2025-06-03 PROCEDURE — 2500000004 HC RX 250 GENERAL PHARMACY W/ HCPCS (ALT 636 FOR OP/ED)

## 2025-06-03 PROCEDURE — 2500000002 HC RX 250 W HCPCS SELF ADMINISTERED DRUGS (ALT 637 FOR MEDICARE OP, ALT 636 FOR OP/ED)

## 2025-06-03 PROCEDURE — 2500000002 HC RX 250 W HCPCS SELF ADMINISTERED DRUGS (ALT 637 FOR MEDICARE OP, ALT 636 FOR OP/ED): Performed by: NURSE PRACTITIONER

## 2025-06-03 PROCEDURE — 36415 COLL VENOUS BLD VENIPUNCTURE: CPT

## 2025-06-03 PROCEDURE — 85027 COMPLETE CBC AUTOMATED: CPT

## 2025-06-03 PROCEDURE — 80069 RENAL FUNCTION PANEL: CPT

## 2025-06-03 RX ORDER — KETOROLAC TROMETHAMINE 30 MG/ML
30 INJECTION, SOLUTION INTRAMUSCULAR; INTRAVENOUS ONCE
Status: COMPLETED | OUTPATIENT
Start: 2025-06-03 | End: 2025-06-03

## 2025-06-03 RX ORDER — AMOXICILLIN AND CLAVULANATE POTASSIUM 875; 125 MG/1; MG/1
1 TABLET, FILM COATED ORAL EVERY 12 HOURS SCHEDULED
Status: DISCONTINUED | OUTPATIENT
Start: 2025-06-03 | End: 2025-06-04 | Stop reason: HOSPADM

## 2025-06-03 RX ORDER — POTASSIUM CHLORIDE 20 MEQ/1
20 TABLET, EXTENDED RELEASE ORAL ONCE
Status: COMPLETED | OUTPATIENT
Start: 2025-06-03 | End: 2025-06-03

## 2025-06-03 RX ORDER — IBUPROFEN 600 MG/1
600 TABLET, FILM COATED ORAL EVERY 8 HOURS PRN
Status: DISCONTINUED | OUTPATIENT
Start: 2025-06-03 | End: 2025-06-04 | Stop reason: HOSPADM

## 2025-06-03 RX ADMIN — ACETAMINOPHEN 487.5 MG: 325 TABLET ORAL at 21:52

## 2025-06-03 RX ADMIN — ROSUVASTATIN CALCIUM 20 MG: 20 TABLET, FILM COATED ORAL at 08:21

## 2025-06-03 RX ADMIN — PIPERACILLIN SODIUM AND TAZOBACTAM SODIUM 3.38 G: 3; .375 INJECTION, SOLUTION INTRAVENOUS at 11:59

## 2025-06-03 RX ADMIN — AMOXICILLIN AND CLAVULANATE POTASSIUM 1 TABLET: 875; 125 TABLET, FILM COATED ORAL at 21:52

## 2025-06-03 RX ADMIN — ACETAMINOPHEN 487.5 MG: 325 TABLET ORAL at 03:58

## 2025-06-03 RX ADMIN — POTASSIUM CHLORIDE 20 MEQ: 1500 TABLET, EXTENDED RELEASE ORAL at 08:57

## 2025-06-03 RX ADMIN — OXYCODONE 10 MG: 5 TABLET ORAL at 08:57

## 2025-06-03 RX ADMIN — PIPERACILLIN SODIUM AND TAZOBACTAM SODIUM 3.38 G: 3; .375 INJECTION, SOLUTION INTRAVENOUS at 00:36

## 2025-06-03 RX ADMIN — KETOROLAC TROMETHAMINE 30 MG: 30 INJECTION, SOLUTION INTRAMUSCULAR; INTRAVENOUS at 12:53

## 2025-06-03 RX ADMIN — PIPERACILLIN SODIUM AND TAZOBACTAM SODIUM 3.38 G: 3; .375 INJECTION, SOLUTION INTRAVENOUS at 06:10

## 2025-06-03 RX ADMIN — ACETAMINOPHEN 487.5 MG: 325 TABLET ORAL at 15:15

## 2025-06-03 RX ADMIN — OXYCODONE 10 MG: 5 TABLET ORAL at 00:35

## 2025-06-03 RX ADMIN — Medication 25 MCG: at 08:21

## 2025-06-03 RX ADMIN — ENOXAPARIN SODIUM 40 MG: 100 INJECTION SUBCUTANEOUS at 08:21

## 2025-06-03 RX ADMIN — ACETAMINOPHEN 487.5 MG: 325 TABLET ORAL at 08:57

## 2025-06-03 ASSESSMENT — COGNITIVE AND FUNCTIONAL STATUS - GENERAL
MOBILITY SCORE: 24
MOBILITY SCORE: 24
DAILY ACTIVITIY SCORE: 24
DAILY ACTIVITIY SCORE: 24

## 2025-06-03 ASSESSMENT — PAIN - FUNCTIONAL ASSESSMENT
PAIN_FUNCTIONAL_ASSESSMENT: 0-10

## 2025-06-03 ASSESSMENT — PAIN SCALES - GENERAL
PAINLEVEL_OUTOF10: 4
PAINLEVEL_OUTOF10: 4
PAINLEVEL_OUTOF10: 0 - NO PAIN
PAINLEVEL_OUTOF10: 6
PAINLEVEL_OUTOF10: 6
PAINLEVEL_OUTOF10: 8
PAINLEVEL_OUTOF10: 8

## 2025-06-03 NOTE — PROGRESS NOTES
[facility]  ACUTE CARE SURGERY - PROGRESS NOTE    Patient Name: Cecilia Pryor  MRN: [id number]  Admit Date: 530  : 1970  AGE: 55 y.o.   GENDER: female  ==============================================================================  TODAY'S ASSESSMENT AND PLAN OF CARE:  Cecilia Pryor is a 55 y.o. year old female patient with a past medical history of hyperlipidemia, appendicitis status post laparoscopic appendectomy on  returning today with worsening abdominal pain.  In the emergency department she is tachycardic to 110, afebrile and normotensive. No leukocytosis on labs. CT AP demonstrating small fluid collections within the pelvis as well as inflammatory wall thickening of ileal loops and rectosigmoid colon    Concern for ongoing abscess formation.  Will continue treatment with IV antibiotics, trial of diet.  If no improvement, will consider repeat scan with possible IR drainage of abscess.    NEURO:   - Multimodal pain control with tylenol, PO oxycodone  - Toradol x1 dose and Ibuprofen 600mg PO every 8 hours added  - continue close monitoring of abdominal pain   CV:  - Vitals x 8hrs  - No indication for telemetry  -Continue home Crestor  PULM:  - ISS,  GI:  - Continue regular diet as tolerated.   -Zofran as needed nausea  -MiraLAX bowel regimen  :  - Replete electrolytes to potassium of 4.0, magnesium of 2.0.  HEME/ID:  - Transition from Zosyn to Augmentin today. Will likely need at least 7-14 day course.   -plan for interval CT outpatient prior to next follow up appointment.     F: PO fluids   E: Replete as needed   N: Regular diet   GI ppx: None  DVT ppx: Lovenox 40 daily     Dispo: Continue current level of care. Possible home tomorrow     Patient seen and discussed with Attending Dr. Trinity [facility]  Acute Care Surgery [id number]    Total time spent on assessment and plan of care approximately thirty minutes     Subjective    ==============================================================================  CHIEF COMPLAINT / EVENTS LAST 24HRS / HPI:  No acute events overnight. Patient seen and evaluated this AM during team rounds.  Patient's pain slightly better than yesterday but continues to endorse RLQ to RUQ tenderness. Taking in small bites of food at a time but able to take in plenty of fluids. Nausea with dinner last night but no vomiting. Endorsing episodes of diarrhea but states only a couple times a day and volume is low.     MEDICAL HISTORY / ROS:   Admission history reviewed. Pertinent changes as follows:  None.       Objective   Vital Signs past 24h:  Heart Rate:  [74-80]   Temp:  [36.1 °C (97 °F)-36.8 °C (98.2 °F)]   Resp:  [16]   BP: (122-167)/(67-73)   SpO2:  [96 %-97 %]     I/O past 24h:  No intake/output data recorded.     PHYSICAL EXAM:  GEN: No acute distress. Alert, awake and conversive.  HEENT: Sclera anicteric. Moist mucous membranes.  RESP: Breathing non-labored, equal chest rise. On RA.  CV: Regular rate,   GI: Abdomen soft, nondistended, RLQ to RUQ tenderness to palpitation with rebound tenderness. Lap sites well approximated and healing.   : deferred  MSK: No gross deformities. Moves all extremities spontaneously.  NEURO: Alert and oriented x3. No focal deficits.  PSYCH: Appropriate mood and affect.  SKIN: No rashes or lesions.    Labs past 24h:  Results for orders placed or performed during the hospital encounter of 05/30/25 (from the past 24 hours)   Magnesium   Result Value Ref Range    Magnesium 2.12 1.60 - 2.40 mg/dL   Renal Function Panel   Result Value Ref Range    Glucose 83 74 - 99 mg/dL    Sodium 140 136 - 145 mmol/L    Potassium 3.6 3.5 - 5.3 mmol/L    Chloride 100 98 - 107 mmol/L    Bicarbonate 28 21 - 32 mmol/L    Anion Gap 16 10 - 20 mmol/L    Urea Nitrogen 8 6 - 23 mg/dL    Creatinine 0.74 0.50 - 1.05 mg/dL    eGFR >90 >60 mL/min/1.73m*2    Calcium 9.0 8.6 - 10.6 mg/dL    Phosphorus 4.6 2.5 -  4.9 mg/dL    Albumin 3.5 3.4 - 5.0 g/dL   CBC   Result Value Ref Range    WBC 7.6 4.4 - 11.3 x10*3/uL    nRBC 0.0 0.0 - 0.0 /100 WBCs    RBC 3.95 (L) 4.00 - 5.20 x10*6/uL    Hemoglobin 11.7 (L) 12.0 - 16.0 g/dL    Hematocrit 35.6 (L) 36.0 - 46.0 %    MCV 90 80 - 100 fL    MCH 29.6 26.0 - 34.0 pg    MCHC 32.9 32.0 - 36.0 g/dL    RDW 12.1 11.5 - 14.5 %    Platelets 315 150 - 450 x10*3/uL         Imaging within past 24h:  Imaging  No results found.      Cardiology, Vascular, and Other Imaging  No other imaging results found for the past 2 days

## 2025-06-03 NOTE — CARE PLAN
The patient's goals for the shift include      The clinical goals for the shift include patient to remain stable    Over the shift, the patient did make progress toward the following goals.   Problem: Safety - Adult  Goal: Free from fall injury  Outcome: Progressing     Problem: Chronic Conditions and Co-morbidities  Goal: Patient's chronic conditions and co-morbidity symptoms are monitored and maintained or improved  Outcome: Progressing

## 2025-06-04 VITALS
BODY MASS INDEX: 24.52 KG/M2 | WEIGHT: 152.56 LBS | RESPIRATION RATE: 16 BRPM | HEART RATE: 85 BPM | TEMPERATURE: 97.2 F | SYSTOLIC BLOOD PRESSURE: 148 MMHG | DIASTOLIC BLOOD PRESSURE: 81 MMHG | HEIGHT: 66 IN | OXYGEN SATURATION: 96 %

## 2025-06-04 LAB
ALBUMIN SERPL BCP-MCNC: 3.7 G/DL (ref 3.4–5)
ANION GAP SERPL CALC-SCNC: 17 MMOL/L (ref 10–20)
BACTERIA BLD CULT: NORMAL
BACTERIA BLD CULT: NORMAL
BUN SERPL-MCNC: 12 MG/DL (ref 6–23)
CALCIUM SERPL-MCNC: 9.1 MG/DL (ref 8.6–10.6)
CHLORIDE SERPL-SCNC: 103 MMOL/L (ref 98–107)
CO2 SERPL-SCNC: 26 MMOL/L (ref 21–32)
CREAT SERPL-MCNC: 0.63 MG/DL (ref 0.5–1.05)
EGFRCR SERPLBLD CKD-EPI 2021: >90 ML/MIN/1.73M*2
ERYTHROCYTE [DISTWIDTH] IN BLOOD BY AUTOMATED COUNT: 12.1 % (ref 11.5–14.5)
GLUCOSE SERPL-MCNC: 82 MG/DL (ref 74–99)
HCT VFR BLD AUTO: 36.6 % (ref 36–46)
HGB BLD-MCNC: 12.1 G/DL (ref 12–16)
MAGNESIUM SERPL-MCNC: 2.3 MG/DL (ref 1.6–2.4)
MCH RBC QN AUTO: 29.3 PG (ref 26–34)
MCHC RBC AUTO-ENTMCNC: 33.1 G/DL (ref 32–36)
MCV RBC AUTO: 89 FL (ref 80–100)
NRBC BLD-RTO: 0 /100 WBCS (ref 0–0)
PHOSPHATE SERPL-MCNC: 3.9 MG/DL (ref 2.5–4.9)
PLATELET # BLD AUTO: 394 X10*3/UL (ref 150–450)
POTASSIUM SERPL-SCNC: 3.9 MMOL/L (ref 3.5–5.3)
RBC # BLD AUTO: 4.13 X10*6/UL (ref 4–5.2)
SODIUM SERPL-SCNC: 142 MMOL/L (ref 136–145)
WBC # BLD AUTO: 7 X10*3/UL (ref 4.4–11.3)

## 2025-06-04 PROCEDURE — 2500000001 HC RX 250 WO HCPCS SELF ADMINISTERED DRUGS (ALT 637 FOR MEDICARE OP): Performed by: NURSE PRACTITIONER

## 2025-06-04 PROCEDURE — 99239 HOSP IP/OBS DSCHRG MGMT >30: CPT | Performed by: NURSE PRACTITIONER

## 2025-06-04 PROCEDURE — 36415 COLL VENOUS BLD VENIPUNCTURE: CPT

## 2025-06-04 PROCEDURE — 2500000002 HC RX 250 W HCPCS SELF ADMINISTERED DRUGS (ALT 637 FOR MEDICARE OP, ALT 636 FOR OP/ED)

## 2025-06-04 PROCEDURE — 83735 ASSAY OF MAGNESIUM: CPT

## 2025-06-04 PROCEDURE — 2500000001 HC RX 250 WO HCPCS SELF ADMINISTERED DRUGS (ALT 637 FOR MEDICARE OP)

## 2025-06-04 PROCEDURE — 85027 COMPLETE CBC AUTOMATED: CPT

## 2025-06-04 PROCEDURE — 84100 ASSAY OF PHOSPHORUS: CPT

## 2025-06-04 PROCEDURE — 2500000004 HC RX 250 GENERAL PHARMACY W/ HCPCS (ALT 636 FOR OP/ED)

## 2025-06-04 RX ORDER — AMOXICILLIN AND CLAVULANATE POTASSIUM 875; 125 MG/1; MG/1
1 TABLET, FILM COATED ORAL EVERY 12 HOURS SCHEDULED
Qty: 18 TABLET | Refills: 0 | Status: SHIPPED | OUTPATIENT
Start: 2025-06-04 | End: 2025-06-13

## 2025-06-04 RX ORDER — OXYCODONE HYDROCHLORIDE 5 MG/1
5 TABLET ORAL EVERY 6 HOURS PRN
Qty: 12 TABLET | Refills: 0 | Status: SHIPPED | OUTPATIENT
Start: 2025-06-04 | End: 2025-06-07

## 2025-06-04 RX ORDER — ONDANSETRON 4 MG/1
4 TABLET, ORALLY DISINTEGRATING ORAL EVERY 8 HOURS PRN
Qty: 9 TABLET | Refills: 0 | Status: SHIPPED | OUTPATIENT
Start: 2025-06-04 | End: 2025-06-07

## 2025-06-04 RX ADMIN — ACETAMINOPHEN 487.5 MG: 325 TABLET ORAL at 09:39

## 2025-06-04 RX ADMIN — AMOXICILLIN AND CLAVULANATE POTASSIUM 1 TABLET: 875; 125 TABLET, FILM COATED ORAL at 08:14

## 2025-06-04 RX ADMIN — IBUPROFEN 600 MG: 600 TABLET ORAL at 02:49

## 2025-06-04 RX ADMIN — ENOXAPARIN SODIUM 40 MG: 100 INJECTION SUBCUTANEOUS at 08:14

## 2025-06-04 RX ADMIN — Medication 25 MCG: at 08:14

## 2025-06-04 RX ADMIN — ROSUVASTATIN CALCIUM 20 MG: 20 TABLET, FILM COATED ORAL at 08:14

## 2025-06-04 ASSESSMENT — COGNITIVE AND FUNCTIONAL STATUS - GENERAL
DAILY ACTIVITIY SCORE: 24
MOBILITY SCORE: 24

## 2025-06-04 ASSESSMENT — PAIN - FUNCTIONAL ASSESSMENT: PAIN_FUNCTIONAL_ASSESSMENT: 0-10

## 2025-06-04 ASSESSMENT — PAIN SCALES - GENERAL: PAINLEVEL_OUTOF10: 0 - NO PAIN

## 2025-06-04 NOTE — CARE PLAN
The patient's goals for the shift include      The clinical goals for the shift include pt to remain stable    Over the shift, the patient did make progress toward the following goals.   Problem: Safety - Adult  Goal: Free from fall injury  Outcome: Met     Problem: Discharge Planning  Goal: Discharge to home or other facility with appropriate resources  Outcome: Met

## 2025-06-04 NOTE — CARE PLAN
The clinical goals for the shift include Pt pain to remain controlled this shift    Problem: Pain - Adult  Goal: Verbalizes/displays adequate comfort level or baseline comfort level  Outcome: Progressing     Problem: Safety - Adult  Goal: Free from fall injury  Outcome: Progressing     Problem: Chronic Conditions and Co-morbidities  Goal: Patient's chronic conditions and co-morbidity symptoms are monitored and maintained or improved  Outcome: Progressing     Problem: Nutrition  Goal: Nutrient intake appropriate for maintaining nutritional needs  Outcome: Progressing     Problem: Pain  Goal: Participates in PT with improved pain control throughout the shift  Outcome: Progressing

## 2025-06-04 NOTE — NURSING NOTE
Patient discharged home iv removed discharge instructions explained and given meds sent to patient pharmacy pt verbalized understanding  Marissa Morley RN

## 2025-06-04 NOTE — DISCHARGE SUMMARY
Discharge Diagnosis  Post-op pain    Issues Requiring Follow-Up  CT A/P follow up on 6/6 and ACS follow up     Test Results Pending At Discharge  Pending Labs       No current pending labs.            Hospital Course  Cecilia Pryor is a 55 y.o. year old female patient with a past medical history of hyperlipidemia, appendicitis status post laparoscopic appendectomy on 5/28 who returned on 5/30 with worsening abdominal pain.  In the emergency department she was tachycardic to 110, afebrile and normotensive. No leukocytosis on labs. CT AP demonstrating small fluid collections within the pelvis as well as inflammatory wall thickening of ileal loops and rectosigmoid colon. Patient admitted for non operative management and started on Zosyn.   Patient with slow to progress with pain and diet. She remained hemodynamically stable, and no leukocytosis. Patient's pain gradually improved over the course of her hospital stay. Her diet continued to be limited to small bites/snacks but to be expected. She was transitioned from Zosyn to Augmentin the day before discharge with no changes in vitals or white count. On the day of discharge, patient remained HDS, tolerating small small solid food intake with adequate fluid intake and pain improved with toradol/ibuprofen. She is to have a follow up CT on 6/6 to reassess abscesses and will follow up in clinic the following week on 6/10. Patient encouraged to call clinic with any questions or concerns- phone number provided. IF fevers, chills, increasing pain or inability to tolerate PO she should return to the ED.   Total time spent on discharge planning and education approximately thirty minutes.     Pertinent Physical Exam At Time of Discharge  Physical Exam  Constitutional:       Appearance: Normal appearance.   Eyes:      Extraocular Movements: Extraocular movements intact.   Cardiovascular:      Comments: Non cyanotic   Pulmonary:      Effort: Pulmonary effort is normal.   Abdominal:       Comments: Soft, focally tender to RLQ improved from prior to exam. Lap sites well approximated with dermabond    Musculoskeletal:         General: Normal range of motion.   Skin:     General: Skin is warm and dry.   Neurological:      Mental Status: She is alert and oriented to person, place, and time.   Psychiatric:         Behavior: Behavior normal.         Home Medications     Medication List      START taking these medications     amoxicillin-clavulanate 875-125 mg tablet; Commonly known as: Augmentin;   Take 1 tablet by mouth every 12 hours for 9 days.   ondansetron ODT 4 mg disintegrating tablet; Commonly known as:   Zofran-ODT; Dissolve 1 tablet (4 mg) in the mouth every 8 hours if needed   for nausea for up to 3 days.   oxyCODONE 5 mg immediate release tablet; Commonly known as: Roxicodone;   Take 1 tablet (5 mg) by mouth every 6 hours if needed for moderate pain (4   - 6) for up to 3 days.     CONTINUE taking these medications     acetaminophen 500 mg tablet; Commonly known as: Tylenol   cetirizine 10 mg capsule; Commonly known as: ZYRTEC   cholecalciferol 25 mcg (1,000 units) capsule; Commonly known as: Vitamin   D-3   levonorgestrel 20 mcg/24hr IUD; Commonly known as: Mirena   rosuvastatin 20 mg tablet; Commonly known as: Crestor; Take 1 tablet (20   mg) by mouth once daily.       Outpatient Follow-Up  Future Appointments   Date Time Provider Department Center   6/6/2025 11:00 AM [facility]016 CT 1 [id number] [facility]   6/10/2025  2:30 PM [facility] [id number] [facility] [id number] [facility]   6/16/2025  1:30 PM Anika Angeles MD [id number] Academic   3/6/2026  8:00 AM Mei Wheeler DO [id number] Jeremy Kathleen, APRN-CNP

## 2025-06-04 NOTE — HOSPITAL COURSE
Cecilia Pryor is a 55 y.o. year old female patient with a past medical history of hyperlipidemia, appendicitis status post laparoscopic appendectomy on 5/28 who returned on 5/30 with worsening abdominal pain.  In the emergency department she was tachycardic to 110, afebrile and normotensive. No leukocytosis on labs. CT AP demonstrating small fluid collections within the pelvis as well as inflammatory wall thickening of ileal loops and rectosigmoid colon. Patient admitted for non operative management and started on Zosyn.   Patient with slow to progress with pain and diet. She remained hemodynamically stable, and no leukocytosis. Patient's pain gradually improved over the course of her hospital stay. Her diet continued to be limited to small bites/snacks but to be expected. She was transitioned from Zosyn to Augmentin the day before discharge with no changes in vitals or white count. On the day of discharge, patient remained HDS, tolerating small small solid food intake with adequate fluid intake and pain improved with toradol/ibuprofen. She is to have a follow up CT on 6/6 to reassess abscesses and will follow up in clinic the following week on 6/10. Patient encouraged to call clinic with any questions or concerns- phone number provided. IF fevers, chills, increasing pain or inability to tolerate PO she should return to the ED.

## 2025-06-06 ENCOUNTER — HOSPITAL ENCOUNTER (OUTPATIENT)
Dept: RADIOLOGY | Facility: CLINIC | Age: 55
End: 2025-06-06
Payer: COMMERCIAL

## 2025-06-06 ENCOUNTER — HOSPITAL ENCOUNTER (OUTPATIENT)
Dept: RADIOLOGY | Facility: CLINIC | Age: 55
Discharge: HOME | End: 2025-06-06
Payer: COMMERCIAL

## 2025-06-06 DIAGNOSIS — K65.1 POSTOPERATIVE INTRA-ABDOMINAL ABSCESS: ICD-10-CM

## 2025-06-06 DIAGNOSIS — T81.43XA POSTOPERATIVE INTRA-ABDOMINAL ABSCESS: ICD-10-CM

## 2025-06-06 DIAGNOSIS — K35.30 ACUTE APPENDICITIS WITH LOCALIZED PERITONITIS, WITHOUT PERFORATION, ABSCESS, OR GANGRENE: ICD-10-CM

## 2025-06-06 PROCEDURE — 74177 CT ABD & PELVIS W/CONTRAST: CPT

## 2025-06-06 PROCEDURE — 2550000001 HC RX 255 CONTRASTS: Performed by: NURSE PRACTITIONER

## 2025-06-06 RX ADMIN — IOHEXOL 75 ML: 350 INJECTION, SOLUTION INTRAVENOUS at 08:55

## 2025-06-09 LAB
LABORATORY COMMENT REPORT: NORMAL
PATH REPORT.FINAL DX SPEC: NORMAL
PATH REPORT.GROSS SPEC: NORMAL
PATH REPORT.RELEVANT HX SPEC: NORMAL
PATH REPORT.TOTAL CANCER: NORMAL

## 2025-06-10 ENCOUNTER — APPOINTMENT (OUTPATIENT)
Dept: SURGERY | Facility: CLINIC | Age: 55
End: 2025-06-10
Payer: COMMERCIAL

## 2025-06-10 VITALS
DIASTOLIC BLOOD PRESSURE: 82 MMHG | WEIGHT: 152 LBS | RESPIRATION RATE: 16 BRPM | BODY MASS INDEX: 23.86 KG/M2 | SYSTOLIC BLOOD PRESSURE: 136 MMHG | HEIGHT: 67 IN | HEART RATE: 96 BPM

## 2025-06-10 DIAGNOSIS — Z51.89 VISIT FOR WOUND CHECK: Primary | ICD-10-CM

## 2025-06-10 ASSESSMENT — PAIN SCALES - GENERAL: PAINLEVEL_OUTOF10: 2

## 2025-06-11 ENCOUNTER — APPOINTMENT (OUTPATIENT)
Dept: SURGERY | Facility: CLINIC | Age: 55
End: 2025-06-11
Payer: COMMERCIAL

## 2025-06-16 ENCOUNTER — OFFICE VISIT (OUTPATIENT)
Dept: OBSTETRICS AND GYNECOLOGY | Facility: CLINIC | Age: 55
End: 2025-06-16
Payer: COMMERCIAL

## 2025-06-16 VITALS
WEIGHT: 154.6 LBS | HEART RATE: 80 BPM | BODY MASS INDEX: 24.51 KG/M2 | DIASTOLIC BLOOD PRESSURE: 83 MMHG | SYSTOLIC BLOOD PRESSURE: 150 MMHG

## 2025-06-16 DIAGNOSIS — Z12.31 ENCOUNTER FOR SCREENING MAMMOGRAM FOR MALIGNANT NEOPLASM OF BREAST: ICD-10-CM

## 2025-06-16 DIAGNOSIS — K35.30 ACUTE APPENDICITIS WITH LOCALIZED PERITONITIS, WITHOUT PERFORATION, ABSCESS, OR GANGRENE: Primary | ICD-10-CM

## 2025-06-16 DIAGNOSIS — Z01.419 WOMEN'S ANNUAL ROUTINE GYNECOLOGICAL EXAMINATION: Primary | ICD-10-CM

## 2025-06-16 DIAGNOSIS — R69 TRAVEL-RELATED ILLNESS: ICD-10-CM

## 2025-06-16 DIAGNOSIS — N87.0 MILD CERVICAL DYSPLASIA: ICD-10-CM

## 2025-06-16 PROCEDURE — 99396 PREV VISIT EST AGE 40-64: CPT | Performed by: OBSTETRICS & GYNECOLOGY

## 2025-06-16 PROCEDURE — 1036F TOBACCO NON-USER: CPT | Performed by: OBSTETRICS & GYNECOLOGY

## 2025-06-16 ASSESSMENT — ENCOUNTER SYMPTOMS
ALLERGIC/IMMUNOLOGIC NEGATIVE: 0
RESPIRATORY NEGATIVE: 0
CARDIOVASCULAR NEGATIVE: 0
CONSTITUTIONAL NEGATIVE: 0
NEUROLOGICAL NEGATIVE: 0
ENDOCRINE NEGATIVE: 0
HEMATOLOGIC/LYMPHATIC NEGATIVE: 0
MUSCULOSKELETAL NEGATIVE: 0
GASTROINTESTINAL NEGATIVE: 0
EYES NEGATIVE: 0
PSYCHIATRIC NEGATIVE: 0

## 2025-06-16 ASSESSMENT — PAIN SCALES - GENERAL: PAINLEVEL_OUTOF10: 0-NO PAIN

## 2025-06-16 NOTE — PROGRESS NOTES
Cecilia Pryor 55 y.o. y/o who presents for annual gyn exam.      Preventive health  Cervical cancer screening:    Breast cancer screening ***  Colon cancer screening ***  STI screening today, patient request  Lipids, TSH, CBC {YES wildcard/NO:60}    Reproductive Life Planning      -------------------------------------  HPI    Recent laparoscopic appendectomy for ruptured appendix  Complicated by re-admit due to pelvic abscesses    Gynecologic History:    Menarche: ***  Menses: ***  Last Pap: multiple no interpretation given, colpo in 2024, JEOVANNY 1  History of Dysplasia: ***  Sexually active: ***  Contraception: ***    History reviewed and updated in patient's History Tab        Vitals:    06/16/25 1340   BP: 150/83   Pulse: 80       Physical Exam  Genitourinary:      Genitourinary Comments: Slightly blueish pinpoint lesion - possibly due to ingrown hair, approximately 1 mm in size, mildly raised

## 2025-06-17 ENCOUNTER — APPOINTMENT (OUTPATIENT)
Dept: INFECTIOUS DISEASES | Facility: CLINIC | Age: 55
End: 2025-06-17
Payer: COMMERCIAL

## 2025-06-17 VITALS
DIASTOLIC BLOOD PRESSURE: 86 MMHG | BODY MASS INDEX: 24.59 KG/M2 | HEART RATE: 85 BPM | WEIGHT: 153 LBS | HEIGHT: 66 IN | SYSTOLIC BLOOD PRESSURE: 139 MMHG

## 2025-06-17 DIAGNOSIS — K35.30 ACUTE APPENDICITIS WITH LOCALIZED PERITONITIS, WITHOUT PERFORATION, ABSCESS, OR GANGRENE: Primary | ICD-10-CM

## 2025-06-17 DIAGNOSIS — K65.1 POSTOPERATIVE INTRA-ABDOMINAL ABSCESS: Primary | ICD-10-CM

## 2025-06-17 DIAGNOSIS — T81.43XA POSTOPERATIVE INTRA-ABDOMINAL ABSCESS: Primary | ICD-10-CM

## 2025-06-17 LAB
BASOPHILS # BLD AUTO: 40 CELLS/UL (ref 0–200)
BASOPHILS NFR BLD AUTO: 0.7 %
CRP SERPL-MCNC: <3 MG/L
EOSINOPHIL # BLD AUTO: 57 CELLS/UL (ref 15–500)
EOSINOPHIL NFR BLD AUTO: 1 %
ERYTHROCYTE [DISTWIDTH] IN BLOOD BY AUTOMATED COUNT: 13.3 % (ref 11–15)
HCT VFR BLD AUTO: 39.7 % (ref 35–45)
HGB BLD-MCNC: 12.8 G/DL (ref 11.7–15.5)
LYMPHOCYTES # BLD AUTO: 1636 CELLS/UL (ref 850–3900)
LYMPHOCYTES NFR BLD AUTO: 28.7 %
MCH RBC QN AUTO: 29.8 PG (ref 27–33)
MCHC RBC AUTO-ENTMCNC: 32.2 G/DL (ref 32–36)
MCV RBC AUTO: 92.3 FL (ref 80–100)
MONOCYTES # BLD AUTO: 405 CELLS/UL (ref 200–950)
MONOCYTES NFR BLD AUTO: 7.1 %
NEUTROPHILS # BLD AUTO: 3563 CELLS/UL (ref 1500–7800)
NEUTROPHILS NFR BLD AUTO: 62.5 %
PLATELET # BLD AUTO: 472 THOUSAND/UL (ref 140–400)
PMV BLD REES-ECKER: 8.8 FL (ref 7.5–12.5)
RBC # BLD AUTO: 4.3 MILLION/UL (ref 3.8–5.1)
WBC # BLD AUTO: 5.7 THOUSAND/UL (ref 3.8–10.8)

## 2025-06-17 PROCEDURE — 99204 OFFICE O/P NEW MOD 45 MIN: CPT | Performed by: INTERNAL MEDICINE

## 2025-06-17 PROCEDURE — 1036F TOBACCO NON-USER: CPT | Performed by: INTERNAL MEDICINE

## 2025-06-17 PROCEDURE — 3008F BODY MASS INDEX DOCD: CPT | Performed by: INTERNAL MEDICINE

## 2025-06-17 RX ORDER — FLUCONAZOLE 200 MG/1
200 TABLET ORAL DAILY
Qty: 3 TABLET | Refills: 0 | Status: SHIPPED | OUTPATIENT
Start: 2025-06-17 | End: 2025-06-20

## 2025-06-17 RX ORDER — AMOXICILLIN AND CLAVULANATE POTASSIUM 875; 125 MG/1; MG/1
1 TABLET, FILM COATED ORAL EVERY 12 HOURS SCHEDULED
Qty: 18 TABLET | Refills: 0 | Status: SHIPPED | OUTPATIENT
Start: 2025-06-17 | End: 2025-06-26

## 2025-06-17 RX ORDER — AMOXICILLIN AND CLAVULANATE POTASSIUM 875; 125 MG/1; MG/1
875 TABLET, FILM COATED ORAL 2 TIMES DAILY
Qty: 21 TABLET | Refills: 1 | Status: SHIPPED | OUTPATIENT
Start: 2025-06-17 | End: 2025-07-08

## 2025-06-17 RX ORDER — PROBENECID 500 MG/1
500 TABLET, FILM COATED ORAL 2 TIMES DAILY
Qty: 42 TABLET | Refills: 1 | Status: SHIPPED | OUTPATIENT
Start: 2025-06-17 | End: 2025-07-08

## 2025-06-17 RX ORDER — AMOXICILLIN AND CLAVULANATE POTASSIUM 875; 125 MG/1; MG/1
875 TABLET, FILM COATED ORAL 2 TIMES DAILY
COMMUNITY
Start: 2025-06-04 | End: 2025-06-17 | Stop reason: SDUPTHER

## 2025-06-17 ASSESSMENT — PAIN SCALES - GENERAL: PAINLEVEL_OUTOF10: 3

## 2025-06-17 NOTE — LETTER
06/17/25    Shayy Neville MD  [id number]  [facility] [address]      Dear Dr. Shayy Neville MD,    Thank you for referring your patient, Cecilia Pryor, to receive care in my office. I have enclosed a summary of the care provided to Cecilia on 06/17/25.    Please contact me with any questions you may have regarding the visit.    Sincerely,         Devan Ackerman MD  [id number]  [address] [address]    CC: No Recipients

## 2025-06-17 NOTE — PROGRESS NOTES
New patient seen at the request of the pediatric colleague who knows the patient well.  Prior to seeing the patient we reviewed extensive records in epic including notes imaging labs family history social history and past medical history  Very briefly Ms. Pryor is an otherwise healthy 55-year-old woman who presented May 28 with appendicitis and had a laparoscopic appendectomy.  The time of the appendectomy it had been noted that her appendix ruptured.  He was discharged after 24 hours from surgery and returned May 30 worsening abdominal pain.  She has sepsis syndrome with a heart rate of 110 and leukocytosis.  CAT scan revealed fluid collections and she was started on Zosyn which she received for several days.  She was discharged and had a follow-up CAT scan June 6 that showed the largest fluid collection was not significantly changed and 2 small fluid collections were better.  She is just about to complete a 14-day course of Augmentin.  She is concerned with the persistent fluid collections she needs a more prolonged antimicrobial therapy.  Feeling better although still has some abdominal pain.  Energy and appetite are improved.  Yesterday we get a CBC with differential which was unremarkable and a CRP which is pending  Schedule going out of town vacation in the next week or so    On exam she looks well.  Her abdomen is soft.  The laparoscopic port sites appear benign.  She had some tenderness to fairly mild palpation in the right upper and middle quadrant.  Bowel sounds are normal.    Assessment-appendicitis complicated by rupture with multiple fluid collections which have not been drained.  At this time the patient does appear to be improving on antimicrobial therapy.  Given the fluid collections I think this calls  for more prolonged therapy.  She has excellent tolerance of Augmentin which is the ideal antibiotic in the situation.  We recommended boosting the efficacy of Augmentin by adding probenecid.  We  gave her 3-week supply.  We also gave her fluconazole in case she gets a yeast infection.  Will contact her once we get the results of the CRP which was ordered yesterday but which is being done under the auspices of [facility].  I plan would be to treat her for least 3 more weeks and see how she is doing.  If her CRP is normal or then normalizes we would want to repeat her CRP off antibiotic therapy and likely repeat a CAT scan.

## 2025-06-17 NOTE — LETTER
06/17/25    Mei Wheeler DO  [address]  [facility]  [facility] [address]      Dear Dr. Mei Wheeler DO,    I am writing to confirm that your patient, Cecilia Pryor, received care in my office on 06/17/25. I have enclosed a summary of the care provided to Cecilia for your reference.    Please contact me with any questions you may have regarding the visit.    Sincerely,         Devan Ackerman MD  [address]  [facility] [address]  [facility] [address]    CC: No Recipients

## 2025-07-02 LAB
CYTOLOGY CMNT CVX/VAG CYTO-IMP: NORMAL
HPV HR 12 DNA GENITAL QL NAA+PROBE: NEGATIVE
HPV HR GENOTYPES PNL CVX NAA+PROBE: NEGATIVE
HPV16 DNA SPEC QL NAA+PROBE: NEGATIVE
HPV18 DNA SPEC QL NAA+PROBE: NEGATIVE
LAB AP CONTRACEPTIVE HISTORY: NORMAL
LAB AP HPV GENOTYPE QUESTION: YES
LAB AP HPV HR: NORMAL
LAB AP PREVIOUS ABNORMAL HISTORY: NORMAL
LABORATORY COMMENT REPORT: NORMAL
LABORATORY COMMENT REPORT: NORMAL
PATH REPORT.TOTAL CANCER: NORMAL

## 2025-07-09 ENCOUNTER — APPOINTMENT (OUTPATIENT)
Dept: SURGERY | Facility: CLINIC | Age: 55
End: 2025-07-09
Payer: COMMERCIAL

## 2025-07-17 DIAGNOSIS — K35.30 ACUTE APPENDICITIS WITH LOCALIZED PERITONITIS, WITHOUT PERFORATION, ABSCESS, OR GANGRENE: ICD-10-CM

## 2025-07-21 LAB — CRP SERPL-MCNC: <3 MG/L

## 2026-03-06 ENCOUNTER — APPOINTMENT (OUTPATIENT)
Dept: PRIMARY CARE | Facility: CLINIC | Age: 56
End: 2026-03-06
Payer: COMMERCIAL

## (undated) DEVICE — Device

## (undated) DEVICE — SLEEVE, KII, Z-THREAD, 5X100CM

## (undated) DEVICE — TROCAR, KII OPTICAL BLADELESS 5MM Z THREAD 100MM LNGTH

## (undated) DEVICE — SUTURE, MONOCRYL, 4-0, 18 IN, PS2, UNDYED

## (undated) DEVICE — RETRIEVAL SYSTEM, MONARCH, 10MM DISP ENDOSCOPIC

## (undated) DEVICE — SCOPE WARMER, LAPAROSCOPE, BAG ONLY, LF

## (undated) DEVICE — STAPLER, ENDO ECHELON 45MM RELOAD, WHITE, REUSABLE

## (undated) DEVICE — TUBE SET, PNEUMOCLEAR, SMOKE EVACU, HIGH-FLOW

## (undated) DEVICE — GOWN, SURGICAL, SMARTGOWN, XLARGE, STERILE

## (undated) DEVICE — STAPLER,  ENDO ECHELON 45MM RELOAD, BLUE

## (undated) DEVICE — STAPLER, ECHELON 3000, 45MM STD

## (undated) DEVICE — MANIFOLD, 4 PORT NEPTUNE STANDARD

## (undated) DEVICE — ANTIFOG, SOLUTION, FOG-OUT

## (undated) DEVICE — SUTURE, VICRYL PLUS, 0, 27IN, UR-6, VIOLET, BRAIDED

## (undated) DEVICE — DRAPE, SHEET, ENDOSCOPY, GENERAL, FENESTRATED, ARMBOARD COVER, 98 X 123.5 IN, DISPOSABLE, LF, STERILE

## (undated) DEVICE — TROCAR SYSTEM, BALLOON, KII GELPORT, 12 X 100MM

## (undated) DEVICE — TUBE, SALEM SUMP, 16 FR X 48IN, ENFIT